# Patient Record
Sex: MALE | Race: WHITE | NOT HISPANIC OR LATINO | URBAN - METROPOLITAN AREA
[De-identification: names, ages, dates, MRNs, and addresses within clinical notes are randomized per-mention and may not be internally consistent; named-entity substitution may affect disease eponyms.]

---

## 2020-05-11 ENCOUNTER — EMERGENCY (EMERGENCY)
Facility: HOSPITAL | Age: 45
LOS: 0 days | Discharge: HOME | End: 2020-05-11
Payer: COMMERCIAL

## 2020-05-11 VITALS
DIASTOLIC BLOOD PRESSURE: 90 MMHG | HEART RATE: 75 BPM | SYSTOLIC BLOOD PRESSURE: 143 MMHG | OXYGEN SATURATION: 94 % | RESPIRATION RATE: 18 BRPM | TEMPERATURE: 98 F

## 2020-05-11 DIAGNOSIS — R50.9 FEVER, UNSPECIFIED: ICD-10-CM

## 2020-05-11 DIAGNOSIS — R11.0 NAUSEA: ICD-10-CM

## 2020-05-11 DIAGNOSIS — I10 ESSENTIAL (PRIMARY) HYPERTENSION: ICD-10-CM

## 2020-05-11 DIAGNOSIS — B34.9 VIRAL INFECTION, UNSPECIFIED: ICD-10-CM

## 2020-05-11 DIAGNOSIS — G35 MULTIPLE SCLEROSIS: ICD-10-CM

## 2020-05-11 PROCEDURE — U0003: CPT

## 2020-05-11 PROCEDURE — 99283 EMERGENCY DEPT VISIT LOW MDM: CPT

## 2020-05-11 NOTE — ED STATDOCS - OBJECTIVE STATEMENT
Pt with PMH of HTN, MS presents to ED with nausea, subjective fever/chills intermittently x 6 days. Pt does not believe he was recently exposed to COVID-19. Pt here for testing.

## 2020-05-11 NOTE — ED STATDOCS - PATIENT PORTAL LINK FT
You can access the FollowMyHealth Patient Portal offered by [facility] by registering at the following website: [email or web address]. By joining [employer]’s FollowMyHealth portal, you will also be able to view your health information using other applications (apps) compatible with our system.

## 2020-05-12 LAB — SARS-COV-2 RNA SPEC QL NAA+PROBE: SIGNIFICANT CHANGE UP

## 2020-06-04 ENCOUNTER — TRANSCRIPTION ENCOUNTER (OUTPATIENT)
Age: 45
End: 2020-06-04

## 2020-10-07 ENCOUNTER — TRANSCRIPTION ENCOUNTER (OUTPATIENT)
Age: 45
End: 2020-10-07

## 2021-10-16 ENCOUNTER — EMERGENCY (EMERGENCY)
Facility: HOSPITAL | Age: 46
LOS: 0 days | Discharge: HOME | End: 2021-10-16
Attending: EMERGENCY MEDICINE
Payer: COMMERCIAL

## 2021-10-16 VITALS
HEART RATE: 76 BPM | SYSTOLIC BLOOD PRESSURE: 120 MMHG | DIASTOLIC BLOOD PRESSURE: 87 MMHG | RESPIRATION RATE: 18 BRPM | OXYGEN SATURATION: 95 % | TEMPERATURE: 98 F

## 2021-10-16 VITALS — WEIGHT: 300.05 LBS | HEIGHT: 72 IN

## 2021-10-16 DIAGNOSIS — R19.7 DIARRHEA, UNSPECIFIED: ICD-10-CM

## 2021-10-16 DIAGNOSIS — Z20.822 CONTACT WITH AND (SUSPECTED) EXPOSURE TO COVID-19: ICD-10-CM

## 2021-10-16 DIAGNOSIS — G35 MULTIPLE SCLEROSIS: ICD-10-CM

## 2021-10-16 DIAGNOSIS — I10 ESSENTIAL (PRIMARY) HYPERTENSION: ICD-10-CM

## 2021-10-16 DIAGNOSIS — R10.9 UNSPECIFIED ABDOMINAL PAIN: ICD-10-CM

## 2021-10-16 DIAGNOSIS — B34.9 VIRAL INFECTION, UNSPECIFIED: ICD-10-CM

## 2021-10-16 DIAGNOSIS — R50.9 FEVER, UNSPECIFIED: ICD-10-CM

## 2021-10-16 LAB
ALBUMIN SERPL ELPH-MCNC: 3.8 G/DL — SIGNIFICANT CHANGE UP (ref 3.3–5)
ALP SERPL-CCNC: 61 U/L — SIGNIFICANT CHANGE UP (ref 40–120)
ALT FLD-CCNC: 53 U/L — SIGNIFICANT CHANGE UP (ref 12–78)
ANION GAP SERPL CALC-SCNC: 5 MMOL/L — SIGNIFICANT CHANGE UP (ref 5–17)
AST SERPL-CCNC: 34 U/L — SIGNIFICANT CHANGE UP (ref 15–37)
BASOPHILS # BLD AUTO: 0.05 K/UL — SIGNIFICANT CHANGE UP (ref 0–0.2)
BASOPHILS NFR BLD AUTO: 0.7 % — SIGNIFICANT CHANGE UP (ref 0–2)
BILIRUB SERPL-MCNC: 0.4 MG/DL — SIGNIFICANT CHANGE UP (ref 0.2–1.2)
BUN SERPL-MCNC: 14 MG/DL — SIGNIFICANT CHANGE UP (ref 7–23)
CALCIUM SERPL-MCNC: 9.4 MG/DL — SIGNIFICANT CHANGE UP (ref 8.5–10.1)
CHLORIDE SERPL-SCNC: 106 MMOL/L — SIGNIFICANT CHANGE UP (ref 96–108)
CO2 SERPL-SCNC: 27 MMOL/L — SIGNIFICANT CHANGE UP (ref 22–31)
CREAT SERPL-MCNC: 0.75 MG/DL — SIGNIFICANT CHANGE UP (ref 0.5–1.3)
EOSINOPHIL # BLD AUTO: 0.19 K/UL — SIGNIFICANT CHANGE UP (ref 0–0.5)
EOSINOPHIL NFR BLD AUTO: 2.6 % — SIGNIFICANT CHANGE UP (ref 0–6)
GLUCOSE SERPL-MCNC: 113 MG/DL — HIGH (ref 70–99)
HCT VFR BLD CALC: 44.6 % — SIGNIFICANT CHANGE UP (ref 39–50)
HGB BLD-MCNC: 15.1 G/DL — SIGNIFICANT CHANGE UP (ref 13–17)
IMM GRANULOCYTES NFR BLD AUTO: 0.4 % — SIGNIFICANT CHANGE UP (ref 0–1.5)
LYMPHOCYTES # BLD AUTO: 2.61 K/UL — SIGNIFICANT CHANGE UP (ref 1–3.3)
LYMPHOCYTES # BLD AUTO: 35.2 % — SIGNIFICANT CHANGE UP (ref 13–44)
MCHC RBC-ENTMCNC: 27.8 PG — SIGNIFICANT CHANGE UP (ref 27–34)
MCHC RBC-ENTMCNC: 33.9 GM/DL — SIGNIFICANT CHANGE UP (ref 32–36)
MCV RBC AUTO: 82.1 FL — SIGNIFICANT CHANGE UP (ref 80–100)
MONOCYTES # BLD AUTO: 0.62 K/UL — SIGNIFICANT CHANGE UP (ref 0–0.9)
MONOCYTES NFR BLD AUTO: 8.4 % — SIGNIFICANT CHANGE UP (ref 2–14)
NEUTROPHILS # BLD AUTO: 3.92 K/UL — SIGNIFICANT CHANGE UP (ref 1.8–7.4)
NEUTROPHILS NFR BLD AUTO: 52.7 % — SIGNIFICANT CHANGE UP (ref 43–77)
PLATELET # BLD AUTO: 278 K/UL — SIGNIFICANT CHANGE UP (ref 150–400)
POTASSIUM SERPL-MCNC: 3.7 MMOL/L — SIGNIFICANT CHANGE UP (ref 3.5–5.3)
POTASSIUM SERPL-SCNC: 3.7 MMOL/L — SIGNIFICANT CHANGE UP (ref 3.5–5.3)
PROT SERPL-MCNC: 7.7 GM/DL — SIGNIFICANT CHANGE UP (ref 6–8.3)
RBC # BLD: 5.43 M/UL — SIGNIFICANT CHANGE UP (ref 4.2–5.8)
RBC # FLD: 14 % — SIGNIFICANT CHANGE UP (ref 10.3–14.5)
SARS-COV-2 RNA SPEC QL NAA+PROBE: SIGNIFICANT CHANGE UP
SODIUM SERPL-SCNC: 138 MMOL/L — SIGNIFICANT CHANGE UP (ref 135–145)
WBC # BLD: 7.42 K/UL — SIGNIFICANT CHANGE UP (ref 3.8–10.5)
WBC # FLD AUTO: 7.42 K/UL — SIGNIFICANT CHANGE UP (ref 3.8–10.5)

## 2021-10-16 PROCEDURE — U0005: CPT

## 2021-10-16 PROCEDURE — 99284 EMERGENCY DEPT VISIT MOD MDM: CPT

## 2021-10-16 PROCEDURE — 85025 COMPLETE CBC W/AUTO DIFF WBC: CPT

## 2021-10-16 PROCEDURE — 99283 EMERGENCY DEPT VISIT LOW MDM: CPT

## 2021-10-16 PROCEDURE — 80053 COMPREHEN METABOLIC PANEL: CPT

## 2021-10-16 PROCEDURE — 36415 COLL VENOUS BLD VENIPUNCTURE: CPT

## 2021-10-16 PROCEDURE — U0003: CPT

## 2021-10-16 RX ORDER — SODIUM CHLORIDE 9 MG/ML
1000 INJECTION INTRAMUSCULAR; INTRAVENOUS; SUBCUTANEOUS ONCE
Refills: 0 | Status: COMPLETED | OUTPATIENT
Start: 2021-10-16 | End: 2021-10-16

## 2021-10-16 RX ADMIN — SODIUM CHLORIDE 1000 MILLILITER(S): 9 INJECTION INTRAMUSCULAR; INTRAVENOUS; SUBCUTANEOUS at 14:17

## 2021-10-16 NOTE — ED ADULT TRIAGE NOTE - CHIEF COMPLAINT QUOTE
c/o abdominal discomfort, diarrhea, fever and diaphoretic episodes for past 5 days, was tested for covid and flu which was negative, has been taking tylenol/advil with no relief in symptomsHX: MS, HTN

## 2021-10-16 NOTE — ED ADULT NURSE NOTE - OBJECTIVE STATEMENT
Patient presents to ED complaining of diarrhea. Patient complaining of diarrhea and fevers since Monday, TMAX 101.3. Last tylenol taken at approx 830 am. Patient complaining of intermittent generalized abdominal pain. Patient complaining of 2-6 episodes of diarrhea daily. Patient able to tolerate PO intake. Patient was seen by PCP, stool sample sent today by PCP. Patient denies respiratory symptoms, N/V, CP, SOB, burning with urination.

## 2021-10-16 NOTE — ED ADULT NURSE NOTE - NSIMPLEMENTINTERV_GEN_ALL_ED
Implemented All Universal Safety Interventions:  [place] to call system. Call bell, personal items and telephone within reach. Instruct patient to call for assistance. Room bathroom lighting operational. Non-slip footwear when patient is off stretcher. Physically safe environment: no spills, clutter or unnecessary equipment. Stretcher in lowest position, wheels locked, appropriate side rails in place.

## 2021-10-16 NOTE — ED STATDOCS - CLINICAL SUMMARY MEDICAL DECISION MAKING FREE TEXT BOX
47 y/o m with hx of MS, with 1 week of low grade fever with some diarrhea and abd cramping, no fever last few days, abd soft, nt, stool cultures sent this morning, check labs, ivf

## 2021-10-16 NOTE — ED STATDOCS - OBJECTIVE STATEMENT
47 y/o male with a PMHx of HTN, MS, presents to the ED c/o diarrhea, abdominal discomfort, fever, chills started on 10/11/21. Tmax 101.6F. Last fever a few days ago. +associated night sweats. COVID test on Monday which was negative. Reports 2 episodes of diarrhea today. Denies vomiting. Pt traveled to [place] a few weeks ago. No sick contacts. +COVID vaccinated.

## 2021-10-16 NOTE — ED STATDOCS - PATIENT PORTAL LINK FT
You can access the FollowMyHealth Patient Portal offered by [facility] by registering at the following website: [email or web address]. By joining [employer]’s FollowMyHealth portal, you will also be able to view your health information using other applications (apps) compatible with our system.

## 2021-10-16 NOTE — ED STATDOCS - ATTENDING CONTRIBUTION TO CARE
I,Ron Campbell MD,  performed the initial face to face bedside interview with this patient regarding history of present illness, review of symptoms and relevant past medical, social and family history.  I completed an independent physical examination.  I was the initial provider who evaluated this patient. I have signed out the follow up of any pending tests (i.e. labs, radiological studies) to the ACP.  I have communicated the patient’s plan of care and disposition with the ACP.  The history, relevant review of systems, past medical and surgical history, medical decision making, and physical examination was documented by the scribe in my presence and I attest to the accuracy of the documentation.

## 2021-10-16 NOTE — ED STATDOCS - NSFOLLOWUPINSTRUCTIONS_ED_ALL_ED_FT
Please follow up with your Primary MD in 1-2 days. Return to ED immediately for any new or concerning symptoms or worsening symptoms    VIRAL SYNDROME - AfterCare(R) Instructions(ER/ED)           Viral Syndrome    WHAT YOU NEED TO KNOW:    Viral syndrome is a term used for symptoms of an infection caused by a virus. Viruses are spread easily from person to person through the air and on shared items.    DISCHARGE INSTRUCTIONS:    Call your local emergency number (911 in the [place]) or have someone else call if:   •You have a seizure.      •You cannot be woken.      •You have chest pain or trouble breathing.      Return to the emergency department if:   •You have a stiff neck, a bad headache, and sensitivity to light.      •You feel weak, dizzy, or confused.      •You stop urinating or urinate a lot less than usual.      •You cough up blood or thick yellow or green mucus.      •You have severe abdominal pain or your abdomen is larger than usual.      Call your doctor if:   •Your symptoms do not get better with treatment or get worse after 3 days.      •You have a rash or ear pain.      •You have burning when you urinate.      •You have questions or concerns about your condition or care.      Medicines: You may need any of the following:   •Acetaminophen decreases pain and fever. It is available without a doctor's order. Ask how much to take and how often to take it. Follow directions. Read the labels of all other medicines you are using to see if they also contain acetaminophen, or ask your doctor or pharmacist. Acetaminophen can cause liver damage if not taken correctly. Do not use more than 4 grams (4,000 milligrams) total of acetaminophen in one day.       •NSAIDs, such as ibuprofen, help decrease swelling, pain, and fever. NSAIDs can cause stomach bleeding or kidney problems in certain people. If you take blood thinner medicine, always ask your healthcare provider if NSAIDs are safe for you. Always read the medicine label and follow directions.      •Cold medicine helps decrease swelling, control a cough, and relieve chest or nasal congestion.       •Saline nasal spray helps decrease nasal congestion.       •Take your medicine as directed. Contact your healthcare provider if you think your medicine is not helping or if you have side effects. Tell him of her if you are allergic to any medicine. Keep a list of the medicines, vitamins, and herbs you take. Include the amounts, and when and why you take them. Bring the list or the pill bottles to follow-up visits. Carry your medicine list with you in case of an emergency.      Manage your symptoms:   •Drink liquids as directed to prevent dehydration. Ask how much liquid to drink each day and which liquids are best for you. Ask if you should drink an oral rehydration solution (ORS). An ORS has the right amounts of water, salts, and sugar you need to replace body fluids. This may help prevent dehydration caused by vomiting or diarrhea. Do not drink liquids with caffeine. Liquids with caffeine can make dehydration worse.      •Get plenty of rest to help your body heal. Take naps throughout the day. Ask your healthcare provider when you can return to work and your normal activities.      •Use a cool mist humidifier to help you breathe easier. Ask your healthcare provider how to use a cool mist humidifier.      •Eat honey or use cough drops for a sore throat. Cough drops are available without a doctor's order. Follow directions for taking cough drops.      •Do not smoke or be close to anyone who is smoking. Nicotine and other chemicals in cigarettes and cigars can cause lung damage. Smoking can also delay healing. Ask your healthcare provider for information if you currently smoke and need help to quit. E-cigarettes or smokeless tobacco still contain nicotine. Talk to your healthcare provider before you use these products.      Prevent the spread of germs:          •Wash your hands often. Wash your hands several times each day. Wash after you use the bathroom, change a child's diaper, and before you prepare or eat food. Use soap and water every time. Rub your soapy hands together, lacing your fingers. Wash the front and back of your hands, and in between your fingers. Use the fingers of one hand to scrub under the fingernails of the other hand. Wash for at least 20 seconds. Rinse with warm, running water for several seconds. Then dry your hands with a clean towel or paper towel. Use hand  that contains alcohol if soap and water are not available. Do not touch your eyes, nose, or mouth without washing your hands first.  Handwashing           •Cover a sneeze or cough. Use a tissue that covers your mouth and nose. Throw the tissue away in a trash can right away. Use the bend of your arm if a tissue is not available. Wash your hands well with soap and water or use a hand .      •Stay away from others while you are sick. Avoid crowds as much as possible.      •Ask about vaccines you may need. Talk to your healthcare provider about your vaccine history. He or she will tell you which vaccines you need, and when to get them.?Get the influenza (flu) vaccine as soon as recommended each year. The flu vaccine is available starting in September or October. Flu viruses change, so it is important to get a flu vaccine every year.      ?Get the pneumonia vaccine if recommended. This vaccine is usually recommended every 5 years. Your provider will tell you when to get this vaccine, if needed.        Follow up with your doctor as directed: Write down your questions so you remember to ask them during your visits.

## 2021-10-16 NOTE — ED STATDOCS - PROGRESS NOTE DETAILS
45 y/o M presents with diarrhea. Pt reports low grade temp, intermittent diarrhea and abdominal cramping for the past week. Had stool cultures sent this morning. No other complaints at this time. -Arsh Stone PA-C 47 y/o M presents with diarrhea. Pt reports low grade temp, intermittent diarrhea and abdominal cramping for the past week. Had stool cultures sent this morning. Denies n/v, CP, SOB, rashes, or other complaints at this time. -Arsh Stone PA-C Results reviewed and discussed with pt. Pt nontoxic and well appearing at time of dc. Discussed importance of close FU with PMD. Pt asked to return to ED immediately for any new or concerning sx or worsening. Pt acknowledges and understands plan -Arsh Stone PA-C

## 2022-03-02 ENCOUNTER — EMERGENCY (EMERGENCY)
Facility: HOSPITAL | Age: 47
LOS: 0 days | Discharge: HOME | End: 2022-03-02
Attending: EMERGENCY MEDICINE
Payer: COMMERCIAL

## 2022-03-02 VITALS
DIASTOLIC BLOOD PRESSURE: 85 MMHG | TEMPERATURE: 98 F | SYSTOLIC BLOOD PRESSURE: 136 MMHG | OXYGEN SATURATION: 98 % | HEART RATE: 86 BPM | RESPIRATION RATE: 18 BRPM

## 2022-03-02 VITALS — HEIGHT: 72 IN | WEIGHT: 300.05 LBS

## 2022-03-02 DIAGNOSIS — N20.0 CALCULUS OF KIDNEY: ICD-10-CM

## 2022-03-02 DIAGNOSIS — I10 ESSENTIAL (PRIMARY) HYPERTENSION: ICD-10-CM

## 2022-03-02 DIAGNOSIS — M54.50 LOW BACK PAIN, UNSPECIFIED: ICD-10-CM

## 2022-03-02 PROBLEM — G35 MULTIPLE SCLEROSIS: Chronic | Status: ACTIVE | Noted: 2021-10-16

## 2022-03-02 LAB
ALBUMIN SERPL ELPH-MCNC: 3.6 G/DL — SIGNIFICANT CHANGE UP (ref 3.3–5)
ALP SERPL-CCNC: 56 U/L — SIGNIFICANT CHANGE UP (ref 40–120)
ALT FLD-CCNC: 50 U/L — SIGNIFICANT CHANGE UP (ref 12–78)
ANION GAP SERPL CALC-SCNC: 5 MMOL/L — SIGNIFICANT CHANGE UP (ref 5–17)
APPEARANCE UR: ABNORMAL
AST SERPL-CCNC: 27 U/L — SIGNIFICANT CHANGE UP (ref 15–37)
BACTERIA # UR AUTO: NEGATIVE — SIGNIFICANT CHANGE UP
BASOPHILS # BLD AUTO: 0.06 K/UL — SIGNIFICANT CHANGE UP (ref 0–0.2)
BASOPHILS NFR BLD AUTO: 0.5 % — SIGNIFICANT CHANGE UP (ref 0–2)
BILIRUB SERPL-MCNC: 0.4 MG/DL — SIGNIFICANT CHANGE UP (ref 0.2–1.2)
BILIRUB UR-MCNC: NEGATIVE — SIGNIFICANT CHANGE UP
BUN SERPL-MCNC: 16 MG/DL — SIGNIFICANT CHANGE UP (ref 7–23)
CALCIUM SERPL-MCNC: 9.5 MG/DL — SIGNIFICANT CHANGE UP (ref 8.5–10.1)
CHLORIDE SERPL-SCNC: 108 MMOL/L — SIGNIFICANT CHANGE UP (ref 96–108)
CO2 SERPL-SCNC: 26 MMOL/L — SIGNIFICANT CHANGE UP (ref 22–31)
COLOR SPEC: YELLOW — SIGNIFICANT CHANGE UP
CREAT SERPL-MCNC: 0.87 MG/DL — SIGNIFICANT CHANGE UP (ref 0.5–1.3)
DIFF PNL FLD: ABNORMAL
EGFR: 107 ML/MIN/1.73M2 — SIGNIFICANT CHANGE UP
EOSINOPHIL # BLD AUTO: 0.13 K/UL — SIGNIFICANT CHANGE UP (ref 0–0.5)
EOSINOPHIL NFR BLD AUTO: 1.2 % — SIGNIFICANT CHANGE UP (ref 0–6)
EPI CELLS # UR: NEGATIVE — SIGNIFICANT CHANGE UP
GLUCOSE SERPL-MCNC: 159 MG/DL — HIGH (ref 70–99)
GLUCOSE UR QL: NEGATIVE — SIGNIFICANT CHANGE UP
HCT VFR BLD CALC: 45 % — SIGNIFICANT CHANGE UP (ref 39–50)
HGB BLD-MCNC: 15.1 G/DL — SIGNIFICANT CHANGE UP (ref 13–17)
IMM GRANULOCYTES NFR BLD AUTO: 0.4 % — SIGNIFICANT CHANGE UP (ref 0–1.5)
KETONES UR-MCNC: NEGATIVE — SIGNIFICANT CHANGE UP
LEUKOCYTE ESTERASE UR-ACNC: NEGATIVE — SIGNIFICANT CHANGE UP
LYMPHOCYTES # BLD AUTO: 2.37 K/UL — SIGNIFICANT CHANGE UP (ref 1–3.3)
LYMPHOCYTES # BLD AUTO: 21.5 % — SIGNIFICANT CHANGE UP (ref 13–44)
MCHC RBC-ENTMCNC: 27.7 PG — SIGNIFICANT CHANGE UP (ref 27–34)
MCHC RBC-ENTMCNC: 33.6 GM/DL — SIGNIFICANT CHANGE UP (ref 32–36)
MCV RBC AUTO: 82.6 FL — SIGNIFICANT CHANGE UP (ref 80–100)
MONOCYTES # BLD AUTO: 0.49 K/UL — SIGNIFICANT CHANGE UP (ref 0–0.9)
MONOCYTES NFR BLD AUTO: 4.4 % — SIGNIFICANT CHANGE UP (ref 2–14)
NEUTROPHILS # BLD AUTO: 7.95 K/UL — HIGH (ref 1.8–7.4)
NEUTROPHILS NFR BLD AUTO: 72 % — SIGNIFICANT CHANGE UP (ref 43–77)
NITRITE UR-MCNC: NEGATIVE — SIGNIFICANT CHANGE UP
PH UR: 7 — SIGNIFICANT CHANGE UP (ref 5–8)
PLATELET # BLD AUTO: 289 K/UL — SIGNIFICANT CHANGE UP (ref 150–400)
POTASSIUM SERPL-MCNC: 3.8 MMOL/L — SIGNIFICANT CHANGE UP (ref 3.5–5.3)
POTASSIUM SERPL-SCNC: 3.8 MMOL/L — SIGNIFICANT CHANGE UP (ref 3.5–5.3)
PROT SERPL-MCNC: 7.3 GM/DL — SIGNIFICANT CHANGE UP (ref 6–8.3)
PROT UR-MCNC: NEGATIVE — SIGNIFICANT CHANGE UP
RBC # BLD: 5.45 M/UL — SIGNIFICANT CHANGE UP (ref 4.2–5.8)
RBC # FLD: 13.9 % — SIGNIFICANT CHANGE UP (ref 10.3–14.5)
RBC CASTS # UR COMP ASSIST: >50 /HPF (ref 0–4)
SODIUM SERPL-SCNC: 139 MMOL/L — SIGNIFICANT CHANGE UP (ref 135–145)
SP GR SPEC: 1.01 — SIGNIFICANT CHANGE UP (ref 1.01–1.02)
UROBILINOGEN FLD QL: NEGATIVE — SIGNIFICANT CHANGE UP
WBC # BLD: 11.04 K/UL — HIGH (ref 3.8–10.5)
WBC # FLD AUTO: 11.04 K/UL — HIGH (ref 3.8–10.5)
WBC UR QL: NEGATIVE — SIGNIFICANT CHANGE UP

## 2022-03-02 PROCEDURE — 81001 URINALYSIS AUTO W/SCOPE: CPT

## 2022-03-02 PROCEDURE — 96375 TX/PRO/DX INJ NEW DRUG ADDON: CPT

## 2022-03-02 PROCEDURE — 74176 CT ABD & PELVIS W/O CONTRAST: CPT | Mod: MA

## 2022-03-02 PROCEDURE — 96374 THER/PROPH/DIAG INJ IV PUSH: CPT

## 2022-03-02 PROCEDURE — 99284 EMERGENCY DEPT VISIT MOD MDM: CPT | Mod: 25

## 2022-03-02 PROCEDURE — 36415 COLL VENOUS BLD VENIPUNCTURE: CPT

## 2022-03-02 PROCEDURE — 85025 COMPLETE CBC W/AUTO DIFF WBC: CPT

## 2022-03-02 PROCEDURE — 74176 CT ABD & PELVIS W/O CONTRAST: CPT | Mod: 26,MA

## 2022-03-02 PROCEDURE — 80053 COMPREHEN METABOLIC PANEL: CPT

## 2022-03-02 PROCEDURE — 99285 EMERGENCY DEPT VISIT HI MDM: CPT

## 2022-03-02 RX ORDER — ONDANSETRON 8 MG/1
1 TABLET, FILM COATED ORAL
Qty: 12 | Refills: 0
Start: 2022-03-02

## 2022-03-02 RX ORDER — OXYCODONE AND ACETAMINOPHEN 5; 325 MG/1; MG/1
1 TABLET ORAL
Qty: 12 | Refills: 0
Start: 2022-03-02

## 2022-03-02 RX ORDER — SODIUM CHLORIDE 9 MG/ML
1000 INJECTION INTRAMUSCULAR; INTRAVENOUS; SUBCUTANEOUS ONCE
Refills: 0 | Status: COMPLETED | OUTPATIENT
Start: 2022-03-02 | End: 2022-03-02

## 2022-03-02 RX ORDER — KETOROLAC TROMETHAMINE 30 MG/ML
15 SYRINGE (ML) INJECTION ONCE
Refills: 0 | Status: DISCONTINUED | OUTPATIENT
Start: 2022-03-02 | End: 2022-03-02

## 2022-03-02 RX ORDER — ONDANSETRON 8 MG/1
4 TABLET, FILM COATED ORAL ONCE
Refills: 0 | Status: COMPLETED | OUTPATIENT
Start: 2022-03-02 | End: 2022-03-02

## 2022-03-02 RX ORDER — TAMSULOSIN HYDROCHLORIDE 0.4 MG/1
1 CAPSULE ORAL
Qty: 7 | Refills: 0
Start: 2022-03-02 | End: 2022-03-08

## 2022-03-02 RX ADMIN — SODIUM CHLORIDE 2000 MILLILITER(S): 9 INJECTION INTRAMUSCULAR; INTRAVENOUS; SUBCUTANEOUS at 11:06

## 2022-03-02 RX ADMIN — ONDANSETRON 4 MILLIGRAM(S): 8 TABLET, FILM COATED ORAL at 10:07

## 2022-03-02 RX ADMIN — Medication 15 MILLIGRAM(S): at 10:40

## 2022-03-02 RX ADMIN — Medication 15 MILLIGRAM(S): at 10:07

## 2022-03-02 NOTE — ED PROVIDER NOTE - CLINICAL SUMMARY MEDICAL DECISION MAKING FREE TEXT BOX
Pt given Toradol, Zofran. Labs and imaging will be obtained. Pt given Toradol, Zofran. Labs and imaging will be obtained. Pt's work up revealed a 5 mm mid ureteral stone, in addition, he does have a mass in the mid pole kidney as well, on re-evaluation, he is feeling much better, I discussed these results with hm, discussed close f/u with urology, he was written for percocet, flomax, zofran for home, he expressed understanding of return precautions and he was dc in stable condition.

## 2022-03-02 NOTE — ED PROVIDER NOTE - PHYSICAL EXAMINATION
Constitutional: NAD AAOx3  Eyes: PERRLA EOMI  Head: Normocephalic atraumatic  Mouth: MMM  Cardiac: regular rate   Resp: Lungs CTAB  GI: Abd s/nd. +Left CVAT  Neuro: CN2-12 intact  Extremities: Intact distal pulses b/l, no calf tenderness, normal ROM b/l UE and LE   Skin: No rashes

## 2022-03-02 NOTE — ED PROVIDER NOTE - CARE PROVIDER_API CALL
Joe Zuniga)  Urology  [address], [address] [place] [address]  Phone: [phone]  Fax: [phone]  Follow Up Time:

## 2022-03-02 NOTE — ED PROVIDER NOTE - PATIENT PORTAL LINK FT
You can access the FollowMyHealth Patient Portal offered by [facility] by registering at the following website: [email or web address]. By joining [employer]’s FollowMyHealth portal, you will also be able to view your health information using other applications (apps) compatible with our system.

## 2022-03-02 NOTE — ED PROVIDER NOTE - NS ED ROS FT
Constitutional: No fever or chills  Eyes: No visual changes  HEENT: No throat pain  CV: No chest pain  Resp: No SOB no cough  GI: No abd pain or vomiting. +Nausea  : No dysuria or hematuria  MSK: +Left-sided back pain  Skin: No rash  Neuro: No headache

## 2022-03-02 NOTE — ED PROVIDER NOTE - OBJECTIVE STATEMENT
45 y/o male with PMHx of HTN, MS presents to the ED c/o left-sided back pain. Pt states he woke up with the pain and initially thought he just slept on it wrong, but pain persisted and worsened. Also developed nausea, so came to the ED for evaluation. No Hx of kidney stones. Denies chest pain, SOB, vomiting, dysuria, hematuria. States he takes Glatiramer acetate injections 3 times a week for his MS, doesn't get any infusions. States this doesn't feel like his MS. Cannot recall neurologist name. NKDA.

## 2022-03-05 ENCOUNTER — INPATIENT (INPATIENT)
Facility: HOSPITAL | Age: 47
LOS: 0 days | Discharge: HOME | DRG: 661 | End: 2022-03-06
Attending: INTERNAL MEDICINE | Admitting: HOSPITALIST
Payer: COMMERCIAL

## 2022-03-05 VITALS
HEART RATE: 93 BPM | HEIGHT: 72 IN | WEIGHT: 300.05 LBS | SYSTOLIC BLOOD PRESSURE: 140 MMHG | OXYGEN SATURATION: 95 % | RESPIRATION RATE: 20 BRPM | DIASTOLIC BLOOD PRESSURE: 109 MMHG | TEMPERATURE: 98 F

## 2022-03-05 DIAGNOSIS — N20.0 CALCULUS OF KIDNEY: ICD-10-CM

## 2022-03-05 LAB
ALBUMIN SERPL ELPH-MCNC: 3.8 G/DL — SIGNIFICANT CHANGE UP (ref 3.3–5)
ALP SERPL-CCNC: 58 U/L — SIGNIFICANT CHANGE UP (ref 40–120)
ALT FLD-CCNC: 45 U/L — SIGNIFICANT CHANGE UP (ref 12–78)
ANION GAP SERPL CALC-SCNC: 6 MMOL/L — SIGNIFICANT CHANGE UP (ref 5–17)
APTT BLD: 32.1 SEC — SIGNIFICANT CHANGE UP (ref 27.5–35.5)
AST SERPL-CCNC: 27 U/L — SIGNIFICANT CHANGE UP (ref 15–37)
BASOPHILS # BLD AUTO: 0.06 K/UL — SIGNIFICANT CHANGE UP (ref 0–0.2)
BASOPHILS NFR BLD AUTO: 0.4 % — SIGNIFICANT CHANGE UP (ref 0–2)
BILIRUB SERPL-MCNC: 0.4 MG/DL — SIGNIFICANT CHANGE UP (ref 0.2–1.2)
BUN SERPL-MCNC: 20 MG/DL — SIGNIFICANT CHANGE UP (ref 7–23)
CALCIUM SERPL-MCNC: 9.4 MG/DL — SIGNIFICANT CHANGE UP (ref 8.5–10.1)
CHLORIDE SERPL-SCNC: 106 MMOL/L — SIGNIFICANT CHANGE UP (ref 96–108)
CO2 SERPL-SCNC: 28 MMOL/L — SIGNIFICANT CHANGE UP (ref 22–31)
CREAT SERPL-MCNC: 1.05 MG/DL — SIGNIFICANT CHANGE UP (ref 0.5–1.3)
EGFR: 88 ML/MIN/1.73M2 — SIGNIFICANT CHANGE UP
EOSINOPHIL # BLD AUTO: 0.11 K/UL — SIGNIFICANT CHANGE UP (ref 0–0.5)
EOSINOPHIL NFR BLD AUTO: 0.7 % — SIGNIFICANT CHANGE UP (ref 0–6)
GLUCOSE SERPL-MCNC: 199 MG/DL — HIGH (ref 70–99)
HCT VFR BLD CALC: 42.2 % — SIGNIFICANT CHANGE UP (ref 39–50)
HGB BLD-MCNC: 14.2 G/DL — SIGNIFICANT CHANGE UP (ref 13–17)
IMM GRANULOCYTES NFR BLD AUTO: 0.6 % — SIGNIFICANT CHANGE UP (ref 0–1.5)
INR BLD: 1.13 RATIO — SIGNIFICANT CHANGE UP (ref 0.88–1.16)
LACTATE SERPL-SCNC: 1.4 MMOL/L — SIGNIFICANT CHANGE UP (ref 0.7–2)
LYMPHOCYTES # BLD AUTO: 19.1 % — SIGNIFICANT CHANGE UP (ref 13–44)
LYMPHOCYTES # BLD AUTO: 2.97 K/UL — SIGNIFICANT CHANGE UP (ref 1–3.3)
MCHC RBC-ENTMCNC: 27.5 PG — SIGNIFICANT CHANGE UP (ref 27–34)
MCHC RBC-ENTMCNC: 33.6 GM/DL — SIGNIFICANT CHANGE UP (ref 32–36)
MCV RBC AUTO: 81.6 FL — SIGNIFICANT CHANGE UP (ref 80–100)
MONOCYTES # BLD AUTO: 0.68 K/UL — SIGNIFICANT CHANGE UP (ref 0–0.9)
MONOCYTES NFR BLD AUTO: 4.4 % — SIGNIFICANT CHANGE UP (ref 2–14)
NEUTROPHILS # BLD AUTO: 11.63 K/UL — HIGH (ref 1.8–7.4)
NEUTROPHILS NFR BLD AUTO: 74.8 % — SIGNIFICANT CHANGE UP (ref 43–77)
PLATELET # BLD AUTO: 259 K/UL — SIGNIFICANT CHANGE UP (ref 150–400)
POTASSIUM SERPL-MCNC: 3.3 MMOL/L — LOW (ref 3.5–5.3)
POTASSIUM SERPL-SCNC: 3.3 MMOL/L — LOW (ref 3.5–5.3)
PROT SERPL-MCNC: 7.1 GM/DL — SIGNIFICANT CHANGE UP (ref 6–8.3)
PROTHROM AB SERPL-ACNC: 13.1 SEC — SIGNIFICANT CHANGE UP (ref 10.5–13.4)
RBC # BLD: 5.17 M/UL — SIGNIFICANT CHANGE UP (ref 4.2–5.8)
RBC # FLD: 13.5 % — SIGNIFICANT CHANGE UP (ref 10.3–14.5)
SARS-COV-2 RNA SPEC QL NAA+PROBE: SIGNIFICANT CHANGE UP
SODIUM SERPL-SCNC: 140 MMOL/L — SIGNIFICANT CHANGE UP (ref 135–145)
WBC # BLD: 15.54 K/UL — HIGH (ref 3.8–10.5)
WBC # FLD AUTO: 15.54 K/UL — HIGH (ref 3.8–10.5)

## 2022-03-05 PROCEDURE — 71045 X-RAY EXAM CHEST 1 VIEW: CPT

## 2022-03-05 PROCEDURE — 87040 BLOOD CULTURE FOR BACTERIA: CPT

## 2022-03-05 PROCEDURE — 85027 COMPLETE CBC AUTOMATED: CPT

## 2022-03-05 PROCEDURE — 93010 ELECTROCARDIOGRAM REPORT: CPT

## 2022-03-05 PROCEDURE — 86900 BLOOD TYPING SEROLOGIC ABO: CPT

## 2022-03-05 PROCEDURE — 86850 RBC ANTIBODY SCREEN: CPT

## 2022-03-05 PROCEDURE — 87086 URINE CULTURE/COLONY COUNT: CPT

## 2022-03-05 PROCEDURE — 99285 EMERGENCY DEPT VISIT HI MDM: CPT

## 2022-03-05 PROCEDURE — 80048 BASIC METABOLIC PNL TOTAL CA: CPT

## 2022-03-05 PROCEDURE — 83605 ASSAY OF LACTIC ACID: CPT

## 2022-03-05 PROCEDURE — 86901 BLOOD TYPING SEROLOGIC RH(D): CPT

## 2022-03-05 PROCEDURE — 36415 COLL VENOUS BLD VENIPUNCTURE: CPT

## 2022-03-05 PROCEDURE — C9399: CPT

## 2022-03-05 PROCEDURE — 71045 X-RAY EXAM CHEST 1 VIEW: CPT | Mod: 26

## 2022-03-05 PROCEDURE — C1769: CPT

## 2022-03-05 PROCEDURE — 85730 THROMBOPLASTIN TIME PARTIAL: CPT

## 2022-03-05 PROCEDURE — 93005 ELECTROCARDIOGRAM TRACING: CPT

## 2022-03-05 PROCEDURE — 99223 1ST HOSP IP/OBS HIGH 75: CPT

## 2022-03-05 PROCEDURE — 76000 FLUOROSCOPY <1 HR PHYS/QHP: CPT

## 2022-03-05 PROCEDURE — 99222 1ST HOSP IP/OBS MODERATE 55: CPT

## 2022-03-05 PROCEDURE — 85610 PROTHROMBIN TIME: CPT

## 2022-03-05 PROCEDURE — C2617: CPT

## 2022-03-05 PROCEDURE — 52332 CYSTOSCOPY AND TREATMENT: CPT | Mod: LT

## 2022-03-05 RX ORDER — KETOROLAC TROMETHAMINE 30 MG/ML
30 SYRINGE (ML) INJECTION ONCE
Refills: 0 | Status: DISCONTINUED | OUTPATIENT
Start: 2022-03-05 | End: 2022-03-05

## 2022-03-05 RX ORDER — TAMSULOSIN HYDROCHLORIDE 0.4 MG/1
0.4 CAPSULE ORAL AT BEDTIME
Refills: 0 | Status: DISCONTINUED | OUTPATIENT
Start: 2022-03-05 | End: 2022-03-06

## 2022-03-05 RX ORDER — HYDROMORPHONE HYDROCHLORIDE 2 MG/ML
0.5 INJECTION INTRAMUSCULAR; INTRAVENOUS; SUBCUTANEOUS ONCE
Refills: 0 | Status: DISCONTINUED | OUTPATIENT
Start: 2022-03-05 | End: 2022-03-05

## 2022-03-05 RX ORDER — PHENAZOPYRIDINE HCL 100 MG
100 TABLET ORAL EVERY 8 HOURS
Refills: 0 | Status: DISCONTINUED | OUTPATIENT
Start: 2022-03-05 | End: 2022-03-06

## 2022-03-05 RX ORDER — ONDANSETRON 8 MG/1
4 TABLET, FILM COATED ORAL ONCE
Refills: 0 | Status: DISCONTINUED | OUTPATIENT
Start: 2022-03-05 | End: 2022-03-06

## 2022-03-05 RX ORDER — GLATIRAMER ACETATE 20 MG/ML
40 INJECTION, SOLUTION SUBCUTANEOUS
Refills: 0 | Status: DISCONTINUED | OUTPATIENT
Start: 2022-03-07 | End: 2022-03-06

## 2022-03-05 RX ORDER — SODIUM CHLORIDE 9 MG/ML
1000 INJECTION, SOLUTION INTRAVENOUS
Refills: 0 | Status: DISCONTINUED | OUTPATIENT
Start: 2022-03-05 | End: 2022-03-06

## 2022-03-05 RX ORDER — FENTANYL CITRATE 50 UG/ML
50 INJECTION INTRAVENOUS
Refills: 0 | Status: DISCONTINUED | OUTPATIENT
Start: 2022-03-05 | End: 2022-03-06

## 2022-03-05 RX ORDER — SODIUM CHLORIDE 9 MG/ML
1000 INJECTION INTRAMUSCULAR; INTRAVENOUS; SUBCUTANEOUS ONCE
Refills: 0 | Status: COMPLETED | OUTPATIENT
Start: 2022-03-05 | End: 2022-03-05

## 2022-03-05 RX ORDER — POTASSIUM CHLORIDE 20 MEQ
10 PACKET (EA) ORAL
Refills: 0 | Status: COMPLETED | OUTPATIENT
Start: 2022-03-05 | End: 2022-03-05

## 2022-03-05 RX ORDER — ONDANSETRON 8 MG/1
4 TABLET, FILM COATED ORAL ONCE
Refills: 0 | Status: DISCONTINUED | OUTPATIENT
Start: 2022-03-05 | End: 2022-03-05

## 2022-03-05 RX ORDER — MORPHINE SULFATE 50 MG/1
4 CAPSULE, EXTENDED RELEASE ORAL ONCE
Refills: 0 | Status: DISCONTINUED | OUTPATIENT
Start: 2022-03-05 | End: 2022-03-05

## 2022-03-05 RX ORDER — LOSARTAN POTASSIUM 100 MG/1
100 TABLET, FILM COATED ORAL DAILY
Refills: 0 | Status: DISCONTINUED | OUTPATIENT
Start: 2022-03-05 | End: 2022-03-06

## 2022-03-05 RX ORDER — CEFTRIAXONE 500 MG/1
1000 INJECTION, POWDER, FOR SOLUTION INTRAMUSCULAR; INTRAVENOUS ONCE
Refills: 0 | Status: COMPLETED | OUTPATIENT
Start: 2022-03-05 | End: 2022-03-05

## 2022-03-05 RX ORDER — FENTANYL CITRATE 50 UG/ML
50 INJECTION INTRAVENOUS
Refills: 0 | Status: DISCONTINUED | OUTPATIENT
Start: 2022-03-05 | End: 2022-03-05

## 2022-03-05 RX ORDER — PHENAZOPYRIDINE HCL 100 MG
200 TABLET ORAL ONCE
Refills: 0 | Status: COMPLETED | OUTPATIENT
Start: 2022-03-05 | End: 2022-03-05

## 2022-03-05 RX ORDER — ONDANSETRON 8 MG/1
4 TABLET, FILM COATED ORAL ONCE
Refills: 0 | Status: COMPLETED | OUTPATIENT
Start: 2022-03-05 | End: 2022-03-05

## 2022-03-05 RX ORDER — CHOLECALCIFEROL (VITAMIN D3) 125 MCG
2000 CAPSULE ORAL DAILY
Refills: 0 | Status: DISCONTINUED | OUTPATIENT
Start: 2022-03-05 | End: 2022-03-06

## 2022-03-05 RX ORDER — ONDANSETRON 8 MG/1
4 TABLET, FILM COATED ORAL EVERY 6 HOURS
Refills: 0 | Status: DISCONTINUED | OUTPATIENT
Start: 2022-03-05 | End: 2022-03-06

## 2022-03-05 RX ORDER — PANTOPRAZOLE SODIUM 20 MG/1
40 TABLET, DELAYED RELEASE ORAL DAILY
Refills: 0 | Status: DISCONTINUED | OUTPATIENT
Start: 2022-03-05 | End: 2022-03-06

## 2022-03-05 RX ORDER — ACETAMINOPHEN 500 MG
650 TABLET ORAL EVERY 6 HOURS
Refills: 0 | Status: DISCONTINUED | OUTPATIENT
Start: 2022-03-05 | End: 2022-03-06

## 2022-03-05 RX ORDER — LANOLIN ALCOHOL/MO/W.PET/CERES
3 CREAM (GRAM) TOPICAL AT BEDTIME
Refills: 0 | Status: DISCONTINUED | OUTPATIENT
Start: 2022-03-05 | End: 2022-03-06

## 2022-03-05 RX ORDER — OXYCODONE HYDROCHLORIDE 5 MG/1
10 TABLET ORAL ONCE
Refills: 0 | Status: DISCONTINUED | OUTPATIENT
Start: 2022-03-05 | End: 2022-03-06

## 2022-03-05 RX ORDER — SODIUM CHLORIDE 9 MG/ML
1000 INJECTION, SOLUTION INTRAVENOUS
Refills: 0 | Status: DISCONTINUED | OUTPATIENT
Start: 2022-03-05 | End: 2022-03-05

## 2022-03-05 RX ORDER — MORPHINE SULFATE 50 MG/1
2 CAPSULE, EXTENDED RELEASE ORAL EVERY 4 HOURS
Refills: 0 | Status: DISCONTINUED | OUTPATIENT
Start: 2022-03-05 | End: 2022-03-06

## 2022-03-05 RX ORDER — SODIUM CHLORIDE 9 MG/ML
1000 INJECTION INTRAMUSCULAR; INTRAVENOUS; SUBCUTANEOUS
Refills: 0 | Status: DISCONTINUED | OUTPATIENT
Start: 2022-03-05 | End: 2022-03-06

## 2022-03-05 RX ORDER — HYDROMORPHONE HYDROCHLORIDE 2 MG/ML
1 INJECTION INTRAMUSCULAR; INTRAVENOUS; SUBCUTANEOUS EVERY 4 HOURS
Refills: 0 | Status: DISCONTINUED | OUTPATIENT
Start: 2022-03-05 | End: 2022-03-06

## 2022-03-05 RX ADMIN — Medication 100 MILLIGRAM(S): at 21:23

## 2022-03-05 RX ADMIN — HYDROMORPHONE HYDROCHLORIDE 0.5 MILLIGRAM(S): 2 INJECTION INTRAMUSCULAR; INTRAVENOUS; SUBCUTANEOUS at 09:15

## 2022-03-05 RX ADMIN — Medication 200 MILLIGRAM(S): at 16:32

## 2022-03-05 RX ADMIN — TAMSULOSIN HYDROCHLORIDE 0.4 MILLIGRAM(S): 0.4 CAPSULE ORAL at 21:23

## 2022-03-05 RX ADMIN — ONDANSETRON 4 MILLIGRAM(S): 8 TABLET, FILM COATED ORAL at 00:58

## 2022-03-05 RX ADMIN — Medication 100 MILLIEQUIVALENT(S): at 08:38

## 2022-03-05 RX ADMIN — SODIUM CHLORIDE 1000 MILLILITER(S): 9 INJECTION INTRAMUSCULAR; INTRAVENOUS; SUBCUTANEOUS at 01:02

## 2022-03-05 RX ADMIN — CEFTRIAXONE 100 MILLIGRAM(S): 500 INJECTION, POWDER, FOR SOLUTION INTRAMUSCULAR; INTRAVENOUS at 08:38

## 2022-03-05 RX ADMIN — Medication 100 MILLIEQUIVALENT(S): at 11:15

## 2022-03-05 RX ADMIN — SODIUM CHLORIDE 100 MILLILITER(S): 9 INJECTION INTRAMUSCULAR; INTRAVENOUS; SUBCUTANEOUS at 08:40

## 2022-03-05 RX ADMIN — Medication 100 MILLIEQUIVALENT(S): at 12:14

## 2022-03-05 RX ADMIN — Medication 30 MILLIGRAM(S): at 00:58

## 2022-03-05 RX ADMIN — MORPHINE SULFATE 4 MILLIGRAM(S): 50 CAPSULE, EXTENDED RELEASE ORAL at 04:22

## 2022-03-05 RX ADMIN — HYDROMORPHONE HYDROCHLORIDE 0.5 MILLIGRAM(S): 2 INJECTION INTRAMUSCULAR; INTRAVENOUS; SUBCUTANEOUS at 07:35

## 2022-03-05 RX ADMIN — SODIUM CHLORIDE 75 MILLILITER(S): 9 INJECTION, SOLUTION INTRAVENOUS at 21:23

## 2022-03-05 RX ADMIN — Medication 30 MILLIGRAM(S): at 05:36

## 2022-03-05 RX ADMIN — MORPHINE SULFATE 4 MILLIGRAM(S): 50 CAPSULE, EXTENDED RELEASE ORAL at 02:09

## 2022-03-05 NOTE — BRIEF OPERATIVE NOTE - NSICDXBRIEFPREOP_GEN_ALL_CORE_FT
PRE-OP DIAGNOSIS:  Renal colic 05-Mar-2022 14:50:36  Kevin Carvalho  Ureteral stone with hydronephrosis 05-Mar-2022 14:51:16  Kevin Carvalho

## 2022-03-05 NOTE — PATIENT PROFILE ADULT - FALL HARM RISK - HARM RISK INTERVENTIONS

## 2022-03-05 NOTE — ED ADULT NURSE NOTE - OBJECTIVE STATEMENT
Pt. presents to ED for L flank pain. Pt. reports recent visit to  on Wednesday dx kidney stone, sent home on oxycodone.  Pt. reports for past few hours nausea, vomit after taking oxycodone PO, no CP or SOB reported., MD LO bedside

## 2022-03-05 NOTE — H&P ADULT - ASSESSMENT
46/M with PMHX of HTN, MS, was brought to the ED for c/o Left flank pain, severe pain, started last night, associated with vomiting. Pt was recently in ED and Dx with left ureteral stone.  Pt seen by urology and going for ureteral stent today.     Pt admitted with     1) 5 mm Left ureteral stent + left hydroureteronephrosis + Severe left flank pain :  admit to med floor  NPO  iv fluids  pain meds prn  to OR for ureteral stent today     2) 2.3 cm partially calcified left midpole renal mass. r/o malignancy  Urology eval for further work up/Bx    3) Hypokalemia 3.3: replace, recheck    4) HTN: cont losartan  hold HCTZ    5) MS: cont copaxone on MWF    6) DVT PPX: ambulation/venodynes    poc discussed with pt, team     Pt is medically optimized and is low risk for ureteral stent.

## 2022-03-05 NOTE — CONSULT NOTE ADULT - ASSESSMENT
45 Y/O M with left renal stone mild hydronephrosis  - NPO, IVF  - Pain control,  - C/W Abx  - Plan of ureteral stent in OR today.  Above plan D/W Dr. Carvalho

## 2022-03-05 NOTE — ED ADULT TRIAGE NOTE - CHIEF COMPLAINT QUOTE
Pt. reports to ED for flank pain. Pt. reports recent visit to  on Wednesday dx kidney stone, sent home on oxycodone.  Pt. reports for past few hours nausea, vomiting 5-6x, pain 10/10 localized to LL back.

## 2022-03-05 NOTE — CONSULT NOTE ADULT - SUBJECTIVE AND OBJECTIVE BOX
HPI Objective Statement: 47 y/o male in ED c/o worsening n/v/left flank pain tonight.   pt states unable to tolerate PO.  and 5-6 episodes of emesis .   Pt states seen in ED a few days ago for same and diagnosed with kidney stone.   pt states has appt with urologist next Friday.   Pt denies any fever, HA, CP, sob.     PAST MEDICAL & SURGICAL HISTORY:  Multiple sclerosis    HTN (hypertension)        REVIEW OF SYSTEMS:    CONSTITUTIONAL:  fevers or chills  HEENT: No visual changes  ENDO: No sweating  NECK: No pain or stiffness  MUSCULOSKELETAL: No back pain, no joint pain  RESPIRATORY: No shortness of breath  CARDIOVASCULAR: No chest pain  GASTROINTESTINAL: Left flank pain, + nausea, vomiting,  No diarrhea or constipation.   NEUROLOGICAL: No mental status changes  PSYCH: No depression, no mood changes  SKIN: No itching      MEDICATIONS  (STANDING):  potassium chloride  10 mEq/100 mL IVPB 10 milliEquivalent(s) IV Intermittent every 1 hour  sodium chloride 0.9%. 1000 milliLiter(s) (100 mL/Hr) IV Continuous <Continuous>    MEDICATIONS  (PRN):  morphine  - Injectable 2 milliGRAM(s) IV Push every 4 hours PRN Severe Pain (7 - 10)  ondansetron Injectable 4 milliGRAM(s) IV Push every 6 hours PRN Nausea and/or Vomiting      Allergies    No Known Allergies    Intolerances        SOCIAL HISTORY: No illicit drug use    FAMILY HISTORY:      Vital Signs Last 24 Hrs  T(C): 37.1 (05 Mar 2022 07:11), Max: 37.1 (05 Mar 2022 07:11)  T(F): 98.7 (05 Mar 2022 07:11), Max: 98.7 (05 Mar 2022 07:11)  HR: 96 (05 Mar 2022 07:11) (87 - 96)  BP: 124/82 (05 Mar 2022 07:11) (111/83 - 140/109)  BP(mean): 95 (05 Mar 2022 07:11) (95 - 95)  RR: 18 (05 Mar 2022 07:11) (18 - 20)  SpO2: 94% (05 Mar 2022 07:11) (94% - 100%)    PHYSICAL EXAM:    Constitutional: NAD, well-developed  HEENT: EOMI  Neck: no pain  Back: left CVA tenderness  Respiratory: No accessory respiratory muscle use  Abd: Soft, NT/ND  no organomegally  no hernia  : Low UOP since last night  Extremities: no edema  Neurological: A/O x 3  Psychiatric: Normal mood, normal affect  Skin: No rashes      LABS:                        14.2   15.54 )-----------( 259      ( 05 Mar 2022 00:54 )             42.2     03-05    140  |  106  |  20  ----------------------------<  199<H>  3.3<L>   |  28  |  1.05    Ca    9.4      05 Mar 2022 00:54    TPro  7.1  /  Alb  3.8  /  TBili  0.4  /  DBili  x   /  AST  27  /  ALT  45  /  AlkPhos  58  03-05    PT/INR - ( 05 Mar 2022 07:49 )   PT: 13.1 sec;   INR: 1.13 ratio         PTT - ( 05 Mar 2022 07:49 )  PTT:32.1 sec    Urine Culture: pending      RADIOLOGY & ADDITIONAL STUDIES:  < from: CT Abdomen and Pelvis No Cont (03.02.22 @ 09:49) >  Mild left hydroureteronephrosis to the level of a 5 mm mid ureteral   calculus. Nonobstructing left intrarenal calculus.    2.3 cm partially calcified left midpole renal mass. Recommend comparison   with prior studies. Otherwise, recommend follow-up pre and postcontrast   CT with renal mass protocol.      < end of copied text >

## 2022-03-05 NOTE — BRIEF OPERATIVE NOTE - COMMENTS
1. Can be discharged when stable and improving labs  2. Continue Flomax  3. Pyridium 100 mg TID x 3 days  4. PO antibiotics: Ceftin 500 mg BID x 5 days   5. Tylenol/Ibuprofen for pain/discomfort   6. Will schedule for out patient Ureteroscopy in few weeks.

## 2022-03-05 NOTE — CONSULT NOTE ADULT - ATTENDING COMMENTS
Renal mass:  Ureteral stone with hydronephrosis:  Renal colic:    Discussed treatment options and recommended Cystoscopy, retrograde pyelogram and left ureteral stent placement.   Pt agreeable.   Risks and benefits were discussed. Informed consent obtained.     Discussed will need definitive management of ureteral stone at later date. Discussed treatment options: ESWL Vs Ureteroscopy.   Risks and benefits of each were discussed. Recommended Ureteroscopy.   Patient agreeable. Will schedule out patient.     Will need repeat CT scan with and without IV contrast Or MRI to evaluate left renal mass.

## 2022-03-05 NOTE — ED ADULT NURSE REASSESSMENT NOTE - NS ED NURSE REASSESS COMMENT FT1
Pt reports pain 2/10 at this time, awaiting urology consult
pt c/o left flank pain again as per MD Carlos give 4 mg morphine

## 2022-03-05 NOTE — H&P ADULT - NSHPPHYSICALEXAM_GEN_ALL_CORE
PHYSICAL EXAM:    Daily Height in cm: 182.88 (05 Mar 2022 00:24)    Daily     Vital Signs Last 24 Hrs  T(C): 36.8 (05 Mar 2022 10:00), Max: 37.1 (05 Mar 2022 07:11)  T(F): 98.2 (05 Mar 2022 10:00), Max: 98.7 (05 Mar 2022 07:11)  HR: 90 (05 Mar 2022 10:00) (87 - 96)  BP: 120/80 (05 Mar 2022 10:00) (111/83 - 140/109)  BP(mean): 95 (05 Mar 2022 07:11) (95 - 95)  RR: 18 (05 Mar 2022 10:00) (18 - 20)  SpO2: 98% (05 Mar 2022 10:00) (94% - 100%)    Constitutional: Weak  appearing  HEENT: Atraumatic, DEMARCUS, Normal, No congestion  Respiratory: Breath Sounds normal, no rhonchi/wheeze  Cardiovascular: N S1S2;   Gastrointestinal: Abdomen soft, non tender, Bowel Sounds present  Extremities: No edema, peripheral pulses present  Neurological: AAO x 3, no gross focal motor deficits  Skin: Non cellulitic, no rash, ulcers  Lymph Nodes: No lymphadenopathy noted  Back: No CVA tenderness   Musculoskeletal: non tender  Breasts: Deferred  Genitourinary: deferred  Rectal: Deferred

## 2022-03-05 NOTE — H&P ADULT - NSHPLABSRESULTS_GEN_ALL_CORE
All Labs/EKG/Radiology/Meds reviewed by me.     Lab Results:  CBC  CBC Full  -  ( 05 Mar 2022 00:54 )  WBC Count : 15.54 K/uL  RBC Count : 5.17 M/uL  Hemoglobin : 14.2 g/dL  Hematocrit : 42.2 %  Platelet Count - Automated : 259 K/uL  Mean Cell Volume : 81.6 fl  Mean Cell Hemoglobin : 27.5 pg  Mean Cell Hemoglobin Concentration : 33.6 gm/dL  Auto Neutrophil # : 11.63 K/uL  Auto Lymphocyte # : 2.97 K/uL  Auto Monocyte # : 0.68 K/uL  Auto Eosinophil # : 0.11 K/uL  Auto Basophil # : 0.06 K/uL  Auto Neutrophil % : 74.8 %  Auto Lymphocyte % : 19.1 %  Auto Monocyte % : 4.4 %  Auto Eosinophil % : 0.7 %  Auto Basophil % : 0.4 %    .		Differential:	[] Automated		[] Manual  Chemistry                        14.2   15.54 )-----------( 259      ( 05 Mar 2022 00:54 )             42.2     03-05    140  |  106  |  20  ----------------------------<  199<H>  3.3<L>   |  28  |  1.05    Ca    9.4      05 Mar 2022 00:54    TPro  7.1  /  Alb  3.8  /  TBili  0.4  /  DBili  x   /  AST  27  /  ALT  45  /  AlkPhos  58  03-05    LIVER FUNCTIONS - ( 05 Mar 2022 00:54 )  Alb: 3.8 g/dL / Pro: 7.1 gm/dL / ALK PHOS: 58 U/L / ALT: 45 U/L / AST: 27 U/L / GGT: x           PT/INR - ( 05 Mar 2022 07:49 )   PT: 13.1 sec;   INR: 1.13 ratio         PTT - ( 05 Mar 2022 07:49 )  PTT:32.1 sec      ekg: nsr 86    RADIOLOGY RESULTS:    < from: CT Abdomen and Pelvis No Cont (03.02.22 @ 09:49) >    Mild left hydroureteronephrosis to the level of a 5 mm mid ureteral   calculus. Nonobstructing left intrarenal calculus.    2.3 cm partially calcified left midpole renal mass. Recommend comparison   with prior studies. Otherwise, recommend follow-up pre and postcontrast   CT with renal mass protocol.    < end of copied text >        MEDICATIONS  (STANDING):  cholecalciferol 2000 Unit(s) Oral daily  glatiramer Injectable 40 milliGRAM(s) SubCutaneous <User Schedule>  losartan 100 milliGRAM(s) Oral daily  pantoprazole    Tablet 40 milliGRAM(s) Oral before breakfast  potassium chloride  10 mEq/100 mL IVPB 10 milliEquivalent(s) IV Intermittent every 1 hour  potassium chloride  10 mEq/100 mL IVPB 10 milliEquivalent(s) IV Intermittent every 1 hour  sodium chloride 0.9%. 1000 milliLiter(s) (100 mL/Hr) IV Continuous <Continuous>  tamsulosin 0.4 milliGRAM(s) Oral at bedtime    MEDICATIONS  (PRN):  acetaminophen     Tablet .. 650 milliGRAM(s) Oral every 6 hours PRN Temp greater or equal to 38C (100.4F), Mild Pain (1 - 3)  aluminum hydroxide/magnesium hydroxide/simethicone Suspension 30 milliLiter(s) Oral every 4 hours PRN Dyspepsia  HYDROmorphone  Injectable 1 milliGRAM(s) IV Push every 4 hours PRN Moderate Pain (4 - 6)  melatonin 3 milliGRAM(s) Oral at bedtime PRN Insomnia  morphine  - Injectable 2 milliGRAM(s) IV Push every 4 hours PRN Severe Pain (7 - 10)  ondansetron Injectable 4 milliGRAM(s) IV Push every 6 hours PRN Nausea and/or Vomiting

## 2022-03-05 NOTE — ED PROVIDER NOTE - PROGRESS NOTE DETAILS
pt with persistent pain.   will give more pain meds and agree with plan for urology consult in the AM Jony MATUTE: Received s/o from Dr. Carlos- spoke with Dr. Carvalho; patient to go to OR today for ureteral stent; in setting of leukocytosis- antibiotics recommended; pre-op labs ordered; endorsed to Dr. Adorno for admission.

## 2022-03-05 NOTE — H&P ADULT - NSICDXFAMILYHX_GEN_ALL_CORE_FT
FAMILY HISTORY:  Father  Still living? Yes, Estimated age: Age Unknown  Family history of multiple myeloma, Age at diagnosis: Age Unknown  FH: lung cancer, Age at diagnosis: Age Unknown

## 2022-03-05 NOTE — H&P ADULT - HISTORY OF PRESENT ILLNESS
46/M with PMHX of HTN, MS, was brought to the ED for c/o Left flank pain, severe pain, started last night, associated with vomiting. Pt was recently in ED and Dx with left ureteral stone.  Pt seen by urology and going for ureteral stent today.

## 2022-03-05 NOTE — ED PROVIDER NOTE - OBJECTIVE STATEMENT
45 y/o male in ED c/o worsening n/v/left flank pain tonight.   pt states unable to tolerate PO.   states 5-6 episodes of emesis .   states seen in ED a few days ago for same and diagnosed with kidney stone.   pt states has appt with urologist next friday.   pt denies any fever, HA, p, sob,

## 2022-03-06 ENCOUNTER — TRANSCRIPTION ENCOUNTER (OUTPATIENT)
Age: 47
End: 2022-03-06

## 2022-03-06 VITALS
OXYGEN SATURATION: 96 % | TEMPERATURE: 98 F | DIASTOLIC BLOOD PRESSURE: 80 MMHG | SYSTOLIC BLOOD PRESSURE: 135 MMHG | RESPIRATION RATE: 16 BRPM | HEART RATE: 79 BPM

## 2022-03-06 LAB
ANION GAP SERPL CALC-SCNC: 4 MMOL/L — LOW (ref 5–17)
BUN SERPL-MCNC: 17 MG/DL — SIGNIFICANT CHANGE UP (ref 7–23)
CALCIUM SERPL-MCNC: 9.1 MG/DL — SIGNIFICANT CHANGE UP (ref 8.5–10.1)
CHLORIDE SERPL-SCNC: 108 MMOL/L — SIGNIFICANT CHANGE UP (ref 96–108)
CO2 SERPL-SCNC: 29 MMOL/L — SIGNIFICANT CHANGE UP (ref 22–31)
CREAT SERPL-MCNC: 0.8 MG/DL — SIGNIFICANT CHANGE UP (ref 0.5–1.3)
EGFR: 110 ML/MIN/1.73M2 — SIGNIFICANT CHANGE UP
GLUCOSE SERPL-MCNC: 110 MG/DL — HIGH (ref 70–99)
HCT VFR BLD CALC: 41.4 % — SIGNIFICANT CHANGE UP (ref 39–50)
HGB BLD-MCNC: 13.6 G/DL — SIGNIFICANT CHANGE UP (ref 13–17)
MCHC RBC-ENTMCNC: 27.5 PG — SIGNIFICANT CHANGE UP (ref 27–34)
MCHC RBC-ENTMCNC: 32.9 GM/DL — SIGNIFICANT CHANGE UP (ref 32–36)
MCV RBC AUTO: 83.6 FL — SIGNIFICANT CHANGE UP (ref 80–100)
PLATELET # BLD AUTO: 257 K/UL — SIGNIFICANT CHANGE UP (ref 150–400)
POTASSIUM SERPL-MCNC: 4 MMOL/L — SIGNIFICANT CHANGE UP (ref 3.5–5.3)
POTASSIUM SERPL-SCNC: 4 MMOL/L — SIGNIFICANT CHANGE UP (ref 3.5–5.3)
RBC # BLD: 4.95 M/UL — SIGNIFICANT CHANGE UP (ref 4.2–5.8)
RBC # FLD: 13.8 % — SIGNIFICANT CHANGE UP (ref 10.3–14.5)
SODIUM SERPL-SCNC: 141 MMOL/L — SIGNIFICANT CHANGE UP (ref 135–145)
WBC # BLD: 14.66 K/UL — HIGH (ref 3.8–10.5)
WBC # FLD AUTO: 14.66 K/UL — HIGH (ref 3.8–10.5)

## 2022-03-06 PROCEDURE — 99239 HOSP IP/OBS DSCHRG MGMT >30: CPT

## 2022-03-06 RX ORDER — CEFTRIAXONE 500 MG/1
1000 INJECTION, POWDER, FOR SOLUTION INTRAMUSCULAR; INTRAVENOUS EVERY 24 HOURS
Refills: 0 | Status: DISCONTINUED | OUTPATIENT
Start: 2022-03-06 | End: 2022-03-06

## 2022-03-06 RX ORDER — ACETAMINOPHEN 500 MG
2 TABLET ORAL
Qty: 0 | Refills: 0 | DISCHARGE
Start: 2022-03-06

## 2022-03-06 RX ORDER — TAMSULOSIN HYDROCHLORIDE 0.4 MG/1
1 CAPSULE ORAL
Qty: 30 | Refills: 0
Start: 2022-03-06 | End: 2022-04-04

## 2022-03-06 RX ORDER — PHENAZOPYRIDINE HCL 100 MG
1 TABLET ORAL
Qty: 9 | Refills: 0
Start: 2022-03-06 | End: 2022-03-08

## 2022-03-06 RX ORDER — CEFUROXIME AXETIL 250 MG
1 TABLET ORAL
Qty: 10 | Refills: 0
Start: 2022-03-06 | End: 2022-03-10

## 2022-03-06 RX ADMIN — Medication 100 MILLIGRAM(S): at 05:36

## 2022-03-06 RX ADMIN — PANTOPRAZOLE SODIUM 40 MILLIGRAM(S): 20 TABLET, DELAYED RELEASE ORAL at 09:07

## 2022-03-06 RX ADMIN — LOSARTAN POTASSIUM 100 MILLIGRAM(S): 100 TABLET, FILM COATED ORAL at 09:07

## 2022-03-06 RX ADMIN — CEFTRIAXONE 100 MILLIGRAM(S): 500 INJECTION, POWDER, FOR SOLUTION INTRAMUSCULAR; INTRAVENOUS at 11:41

## 2022-03-06 RX ADMIN — Medication 2000 UNIT(S): at 09:07

## 2022-03-06 NOTE — DISCHARGE NOTE PROVIDER - NSDCMRMEDTOKEN_GEN_ALL_CORE_FT
acetaminophen 325 mg oral tablet: 2 tab(s) orally every 6 hours, As needed, Temp greater or equal to 38C (100.4F), Mild Pain (1 - 3)  cefuroxime 500 mg oral tablet: 1 tab(s) orally 2 times a day   Copaxone 40 mg/mL subcutaneous solution: 40 milligram(s) subcutaneous Monday, Wednesday, and Friday  Flomax 0.4 mg oral capsule: 1 cap(s) orally once a day   losartan 100 mg oral tablet: 1 tab(s) orally once a day  NexIUM 40 mg oral delayed release capsule: 1 cap(s) orally once a day  Pyridium 100 mg oral tablet: 1 tab(s) orally 3 times a day   Vitamin D3 25 mcg (1000 intl units) oral tablet: 1 tab(s) orally once a day

## 2022-03-06 NOTE — DISCHARGE NOTE PROVIDER - HOSPITAL COURSE
Patient presented with LT renal colic. Underwent cystoscopy with LT ureteral stent placement. Feels good after. Stable for discharge for outpatient urology follow up

## 2022-03-06 NOTE — DISCHARGE NOTE NURSING/CASE MANAGEMENT/SOCIAL WORK - PATIENT PORTAL LINK FT
You can access the FollowMyHealth Patient Portal offered by [facility] by registering at the following website: [email or web address]. By joining [facility]’s FollowMyHealth portal, you will also be able to view your health information using other applications (apps) compatible with our system.

## 2022-03-06 NOTE — DISCHARGE NOTE PROVIDER - CARE PROVIDER_API CALL
Kevin Carvalho)  Urology  [address], [address] [place] [address]  Phone: [phone]  Fax: [phone]  Follow Up Time: 2 weeks

## 2022-03-06 NOTE — DISCHARGE NOTE PROVIDER - NSDCCPCAREPLAN_GEN_ALL_CORE_FT
PRINCIPAL DISCHARGE DIAGNOSIS  Diagnosis: Renal colic  Assessment and Plan of Treatment: S/p stent placement- PO abx. Outpatient f/u with DR Carvalho      SECONDARY DISCHARGE DIAGNOSES  Diagnosis: Left renal mass  Assessment and Plan of Treatment: Outpatient work up with DR Carvalho

## 2022-03-06 NOTE — PROGRESS NOTE ADULT - SUBJECTIVE AND OBJECTIVE BOX
CC- left renal colic    HPI:  46/M with PMHX of HTN, MS, was brought to the ED for c/o Left flank pain, severe pain, started last night, associated with vomiting. Pt was recently in ED and Dx with left ureteral stone.  Pt seen by urology and going for ureteral stent today.  (05 Mar 2022 10:44)    3/6/22- s/p cystoscopy and LT ureteral stent placement yesterday. Feels better today. No pain. Wants to go home    Review of system- All 10 systems reviewed and is as per HPI otherwise negative.     T(C): 36.7 (03-06-22 @ 09:06), Max: 36.9 (03-05-22 @ 19:50)  HR: 79 (03-06-22 @ 09:06) (79 - 98)  BP: 135/80 (03-06-22 @ 09:06) (101/62 - 426/84)  RR: 16 (03-06-22 @ 09:06) (10 - 18)  SpO2: 96% (03-06-22 @ 09:06) (93% - 100%)  Wt(kg): --    LABS:                        13.6   14.66 )-----------( 257      ( 06 Mar 2022 08:10 )             41.4     03-06    141  |  108  |  17  ----------------------------<  110<H>  4.0   |  29  |  0.80    Ca    9.1      06 Mar 2022 08:10    TPro  7.1  /  Alb  3.8  /  TBili  0.4  /  DBili  x   /  AST  27  /  ALT  45  /  AlkPhos  58  03-05    PT/INR - ( 05 Mar 2022 07:49 )   PT: 13.1 sec;   INR: 1.13 ratio       PTT - ( 05 Mar 2022 07:49 )  PTT:32.1 sec    RADIOLOGY & ADDITIONAL TESTS:  CT ABDOMEN AND PELVIS                        PROCEDURE DATE:  03/02/2022      INTERPRETATION:  CLINICAL INFORMATION: 46 years  Male with flank pain.    COMPARISON: None.    CONTRAST/COMPLICATIONS:  IV Contrast: None  OralContrast: None  Complications: None    PROCEDURE:  CT of the Abdomen and Pelvis was performed in the prone position.  Sagittal and coronal reformats were performed.    LIMITATIONS: Evaluation of the solid organs, vascular structures and GI   tract islimited without oral and IV contrast. Hepatic dome partially   omitted.    FINDINGS:  LOWER CHEST: Within normal limits.    LIVER: Steatosis.  BILE DUCTS: Normal caliber.  GALLBLADDER: Within normal limits.  SPLEEN: Within normal limits.  PANCREAS: Within normal limits.  ADRENALS: Within normal limits.  KIDNEYS/URETERS: Mild left hydroureteronephrosis to the level of a 5 mm   mid ureteral calculus. 2.3 cm left midpole partially calcified mass 9 mm   nonobstructing left intrarenal calculus. No right hydroureteronephrosis   or calculi.    BLADDER: Within normal limits.  REPRODUCTIVE ORGANS:    BOWEL: No bowel obstruction. Appendix is normal.  PERITONEUM: No ascites.  VESSELS: Within normal limits.  RETROPERITONEUM/LYMPH NODES: No lymphadenopathy.  ABDOMINAL WALL: Subcutaneous fat infiltration in the anterior abdominal   wall either dependent edema or possibly injection sites.  BONES: Mild thoracic degenerative changes.    IMPRESSION:  Mild left hydroureteronephrosis to the level of a 5 mm mid ureteral   calculus. Nonobstructing left intrarenal calculus.    2.3 cm partially calcified left midpole renal mass. Recommend comparison   with prior studies. Otherwise, recommend follow-up pre and postcontrast   CT with renal mass protocol.    PHYSICAL EXAM:  GENERAL: NAD, well-groomed, well-developed  HEAD:  Atraumatic, Normocephalic  EYES: EOMI, PERRLA, conjunctiva and sclera clear  HEENT: Moist mucous membranes  NECK: Supple, No JVD  NERVOUS SYSTEM:  Alert & Oriented X3, Motor Strength 5/5 B/L upper and lower extremities; DTRs 2+ intact and symmetric  CHEST/LUNG: Clear to auscultation bilaterally; No rales, rhonchi, wheezing, or rubs  HEART: Regular rate and rhythm; No murmurs, rubs, or gallops  ABDOMEN: Soft, Nontender, Nondistended; Bowel sounds present  GENITOURINARY- Voiding, no palpable bladder  EXTREMITIES:  2+ Peripheral Pulses, No clubbing, cyanosis, or edema  MUSCULOSKELTAL- No muscle tenderness, Muscle tone normal, No joint tenderness, no Joint swelling, Joint range of motion-normal  SKIN-no rash, no lesion  CNS- alert, oriented X3, non focal     MEDICATIONS  (STANDING):  cefTRIAXone   IVPB 1000 milliGRAM(s) IV Intermittent every 24 hours  cholecalciferol 2000 Unit(s) Oral daily  glatiramer Injectable 40 milliGRAM(s) SubCutaneous <User Schedule>  lactated ringers. 1000 milliLiter(s) (75 mL/Hr) IV Continuous <Continuous>  losartan 100 milliGRAM(s) Oral daily  pantoprazole    Tablet 40 milliGRAM(s) Oral daily  phenazopyridine 100 milliGRAM(s) Oral every 8 hours  sodium chloride 0.9%. 1000 milliLiter(s) (100 mL/Hr) IV Continuous <Continuous>  tamsulosin 0.4 milliGRAM(s) Oral at bedtime    MEDICATIONS  (PRN):  acetaminophen     Tablet .. 650 milliGRAM(s) Oral every 6 hours PRN Temp greater or equal to 38C (100.4F), Mild Pain (1 - 3)  aluminum hydroxide/magnesium hydroxide/simethicone Suspension 30 milliLiter(s) Oral every 4 hours PRN Dyspepsia  fentaNYL    Injectable 50 MICROGram(s) IV Push every 5 minutes PRN Severe Pain (7 - 10)  HYDROmorphone  Injectable 1 milliGRAM(s) IV Push every 4 hours PRN Moderate Pain (4 - 6)  melatonin 3 milliGRAM(s) Oral at bedtime PRN Insomnia  morphine  - Injectable 2 milliGRAM(s) IV Push every 4 hours PRN Severe Pain (7 - 10)  ondansetron Injectable 4 milliGRAM(s) IV Push every 6 hours PRN Nausea and/or Vomiting  ondansetron Injectable 4 milliGRAM(s) IV Push once PRN Nausea and/or Vomiting  oxyCODONE    IR 10 milliGRAM(s) Oral once PRN Severe Pain (7 - 10)    Assessment/Plan  #LT renal colic 2 to obstructing stone with LT hydroureteronephrosis  #S/p cystoscopy with LT stent placement  Feels better today  Urology eval and recommendations noted  Will give 1g of Rocephin now and discharge on Ceftin 500mg BID x5 days  Scripts for Flomax and Pyridium sent to the pharmacy    #2.3 cm partially calcified left midpole renal mass. r/o malignancy  Outpatient urology follow up    #Hypokalemia- repleted    #HTN- cont home meds    #MS: cont copaxone on MWF    #Dispo- home today. DC time 45 mins

## 2022-03-07 PROBLEM — Z00.00 ENCOUNTER FOR PREVENTIVE HEALTH EXAMINATION: Status: ACTIVE | Noted: 2022-03-07

## 2022-03-10 ENCOUNTER — NON-APPOINTMENT (OUTPATIENT)
Age: 47
End: 2022-03-10

## 2022-03-10 LAB
CULTURE RESULTS: SIGNIFICANT CHANGE UP
CULTURE RESULTS: SIGNIFICANT CHANGE UP
SPECIMEN SOURCE: SIGNIFICANT CHANGE UP
SPECIMEN SOURCE: SIGNIFICANT CHANGE UP

## 2022-03-11 ENCOUNTER — APPOINTMENT (OUTPATIENT)
Dept: UROLOGY | Facility: CLINIC | Age: 47
End: 2022-03-11

## 2022-03-14 ENCOUNTER — NON-APPOINTMENT (OUTPATIENT)
Age: 47
End: 2022-03-14

## 2022-03-14 ENCOUNTER — OUTPATIENT (OUTPATIENT)
Dept: OUTPATIENT SERVICES | Facility: HOSPITAL | Age: 47
LOS: 1 days | End: 2022-03-14
Payer: COMMERCIAL

## 2022-03-14 ENCOUNTER — RESULT REVIEW (OUTPATIENT)
Age: 47
End: 2022-03-14

## 2022-03-14 VITALS
HEART RATE: 90 BPM | TEMPERATURE: 98 F | DIASTOLIC BLOOD PRESSURE: 90 MMHG | WEIGHT: 300.05 LBS | RESPIRATION RATE: 18 BRPM | OXYGEN SATURATION: 100 % | HEIGHT: 72 IN | SYSTOLIC BLOOD PRESSURE: 155 MMHG

## 2022-03-14 DIAGNOSIS — Z01.818 ENCOUNTER FOR OTHER PREPROCEDURAL EXAMINATION: ICD-10-CM

## 2022-03-14 DIAGNOSIS — N13.2 HYDRONEPHROSIS WITH RENAL AND URETERAL CALCULOUS OBSTRUCTION: ICD-10-CM

## 2022-03-14 DIAGNOSIS — Z98.890 OTHER SPECIFIED POSTPROCEDURAL STATES: Chronic | ICD-10-CM

## 2022-03-14 DIAGNOSIS — N20.1 CALCULUS OF URETER: ICD-10-CM

## 2022-03-14 DIAGNOSIS — G35 MULTIPLE SCLEROSIS: ICD-10-CM

## 2022-03-14 DIAGNOSIS — Z96.0 PRESENCE OF UROGENITAL IMPLANTS: Chronic | ICD-10-CM

## 2022-03-14 DIAGNOSIS — E87.6 HYPOKALEMIA: ICD-10-CM

## 2022-03-14 LAB
ANION GAP SERPL CALC-SCNC: 4 MMOL/L — LOW (ref 5–17)
APPEARANCE UR: ABNORMAL
APTT BLD: 35.7 SEC — HIGH (ref 27.5–35.5)
BASOPHILS # BLD AUTO: 0.06 K/UL — SIGNIFICANT CHANGE UP (ref 0–0.2)
BASOPHILS NFR BLD AUTO: 0.6 % — SIGNIFICANT CHANGE UP (ref 0–2)
BILIRUB UR-MCNC: NEGATIVE — SIGNIFICANT CHANGE UP
BUN SERPL-MCNC: 16 MG/DL — SIGNIFICANT CHANGE UP (ref 7–23)
CALCIUM SERPL-MCNC: 9.3 MG/DL — SIGNIFICANT CHANGE UP (ref 8.5–10.1)
CHLORIDE SERPL-SCNC: 107 MMOL/L — SIGNIFICANT CHANGE UP (ref 96–108)
CO2 SERPL-SCNC: 28 MMOL/L — SIGNIFICANT CHANGE UP (ref 22–31)
COLOR SPEC: ABNORMAL
CREAT SERPL-MCNC: 0.89 MG/DL — SIGNIFICANT CHANGE UP (ref 0.5–1.3)
DIFF PNL FLD: ABNORMAL
EGFR: 106 ML/MIN/1.73M2 — SIGNIFICANT CHANGE UP
EOSINOPHIL # BLD AUTO: 0.29 K/UL — SIGNIFICANT CHANGE UP (ref 0–0.5)
EOSINOPHIL NFR BLD AUTO: 2.8 % — SIGNIFICANT CHANGE UP (ref 0–6)
GLUCOSE SERPL-MCNC: 135 MG/DL — HIGH (ref 70–99)
GLUCOSE UR QL: NEGATIVE — SIGNIFICANT CHANGE UP
HCT VFR BLD CALC: 45.2 % — SIGNIFICANT CHANGE UP (ref 39–50)
HGB BLD-MCNC: 14.9 G/DL — SIGNIFICANT CHANGE UP (ref 13–17)
IMM GRANULOCYTES NFR BLD AUTO: 0.4 % — SIGNIFICANT CHANGE UP (ref 0–1.5)
INR BLD: 1 RATIO — SIGNIFICANT CHANGE UP (ref 0.88–1.16)
KETONES UR-MCNC: NEGATIVE — SIGNIFICANT CHANGE UP
LEUKOCYTE ESTERASE UR-ACNC: ABNORMAL
LYMPHOCYTES # BLD AUTO: 29.4 % — SIGNIFICANT CHANGE UP (ref 13–44)
LYMPHOCYTES # BLD AUTO: 3.01 K/UL — SIGNIFICANT CHANGE UP (ref 1–3.3)
MCHC RBC-ENTMCNC: 27.4 PG — SIGNIFICANT CHANGE UP (ref 27–34)
MCHC RBC-ENTMCNC: 33 GM/DL — SIGNIFICANT CHANGE UP (ref 32–36)
MCV RBC AUTO: 83.1 FL — SIGNIFICANT CHANGE UP (ref 80–100)
MONOCYTES # BLD AUTO: 0.49 K/UL — SIGNIFICANT CHANGE UP (ref 0–0.9)
MONOCYTES NFR BLD AUTO: 4.8 % — SIGNIFICANT CHANGE UP (ref 2–14)
NEUTROPHILS # BLD AUTO: 6.36 K/UL — SIGNIFICANT CHANGE UP (ref 1.8–7.4)
NEUTROPHILS NFR BLD AUTO: 62 % — SIGNIFICANT CHANGE UP (ref 43–77)
NITRITE UR-MCNC: NEGATIVE — SIGNIFICANT CHANGE UP
PH UR: 6 — SIGNIFICANT CHANGE UP (ref 5–8)
PLATELET # BLD AUTO: 325 K/UL — SIGNIFICANT CHANGE UP (ref 150–400)
POTASSIUM SERPL-MCNC: 3.5 MMOL/L — SIGNIFICANT CHANGE UP (ref 3.5–5.3)
POTASSIUM SERPL-SCNC: 3.5 MMOL/L — SIGNIFICANT CHANGE UP (ref 3.5–5.3)
PROT UR-MCNC: 100
PROTHROM AB SERPL-ACNC: 11.6 SEC — SIGNIFICANT CHANGE UP (ref 10.5–13.4)
RBC # BLD: 5.44 M/UL — SIGNIFICANT CHANGE UP (ref 4.2–5.8)
RBC # FLD: 13.9 % — SIGNIFICANT CHANGE UP (ref 10.3–14.5)
SODIUM SERPL-SCNC: 139 MMOL/L — SIGNIFICANT CHANGE UP (ref 135–145)
SP GR SPEC: 1.02 — SIGNIFICANT CHANGE UP (ref 1.01–1.02)
UROBILINOGEN FLD QL: NEGATIVE — SIGNIFICANT CHANGE UP
WBC # BLD: 10.25 K/UL — SIGNIFICANT CHANGE UP (ref 3.8–10.5)
WBC # FLD AUTO: 10.25 K/UL — SIGNIFICANT CHANGE UP (ref 3.8–10.5)

## 2022-03-14 PROCEDURE — G0463: CPT | Mod: 25

## 2022-03-14 PROCEDURE — 85025 COMPLETE CBC W/AUTO DIFF WBC: CPT

## 2022-03-14 PROCEDURE — 85610 PROTHROMBIN TIME: CPT

## 2022-03-14 PROCEDURE — 80048 BASIC METABOLIC PNL TOTAL CA: CPT

## 2022-03-14 PROCEDURE — 71046 X-RAY EXAM CHEST 2 VIEWS: CPT | Mod: 26

## 2022-03-14 PROCEDURE — 87086 URINE CULTURE/COLONY COUNT: CPT

## 2022-03-14 PROCEDURE — 71046 X-RAY EXAM CHEST 2 VIEWS: CPT

## 2022-03-14 PROCEDURE — 85730 THROMBOPLASTIN TIME PARTIAL: CPT

## 2022-03-14 PROCEDURE — 36415 COLL VENOUS BLD VENIPUNCTURE: CPT

## 2022-03-14 PROCEDURE — 93010 ELECTROCARDIOGRAM REPORT: CPT

## 2022-03-14 PROCEDURE — 81001 URINALYSIS AUTO W/SCOPE: CPT

## 2022-03-14 PROCEDURE — 86850 RBC ANTIBODY SCREEN: CPT

## 2022-03-14 PROCEDURE — 86900 BLOOD TYPING SEROLOGIC ABO: CPT

## 2022-03-14 PROCEDURE — 93005 ELECTROCARDIOGRAM TRACING: CPT

## 2022-03-14 PROCEDURE — 86901 BLOOD TYPING SEROLOGIC RH(D): CPT

## 2022-03-14 NOTE — H&P PST ADULT - NSICDXFAMILYHX_GEN_ALL_CORE_FT
FAMILY HISTORY:  Father  Still living? Yes, Estimated age: Age Unknown  Family history of heart disease, Age at diagnosis: Age Unknown  Family history of multiple myeloma, Age at diagnosis: Age Unknown  FH: lung cancer, Age at diagnosis: Age Unknown

## 2022-03-14 NOTE — H&P PST ADULT - ASSESSMENT
45 y/o male with left ureteral stone. Pt reports he was in ER on 03/05/2022 due to left flank pain and hematuria, he had a ureteral stent placed that day. He is scheduled for Cystoscopy, ureteroscopy, lithotripsy of stone with ureteral stent placement.   Plan  1. Stop all NSAIDS, herbal supplements and vitamins for 7 days.  2. NPO  as per ASU instructions.  3. COVID test on 03/21/2022, pt instructed.   4. Labs, EKG , CXR as per surgeon.  5. PMD visit for optimization prior to surgery as per surgeon           45 y/o male with left ureteral stone. Pt reports he was in ER on 03/05/2022 due to left flank pain and hematuria, he had a ureteral stent placed that day. He is scheduled for Cystoscopy, ureteroscopy, lithotripsy of stone with ureteral stent exchange.   Plan  1. Stop all NSAIDS, herbal supplements and vitamins for 7 days.  2. NPO  as per ASU instructions.  3. COVID test on 03/21/2022, pt instructed.   4. Labs, EKG , CXR as per surgeon.  5. PMD visit for optimization prior to surgery as per surgeon

## 2022-03-14 NOTE — H&P PST ADULT - NSICDXPASTMEDICALHX_GEN_ALL_CORE_FT
PAST MEDICAL HISTORY:  GERD (gastroesophageal reflux disease)     HTN (hypertension)     Left ureteral stone     Multiple sclerosis     Obesity

## 2022-03-14 NOTE — H&P PST ADULT - FALL HARM RISK - HARM RISK INTERVENTIONS

## 2022-03-14 NOTE — H&P PST ADULT - HISTORY OF PRESENT ILLNESS
47 y/o male with left ureteral stone. Pt reports he was in ER on 03/05/2022 due to left flank pain and hematuria, he had a ureteral stent placed that day. He is here for PST for planned Cystoscopy, ureteroscopy, lithotripsy of stone with ureteral stent placement.  45 y/o male with left ureteral stone. Pt reports he was in ER on 03/05/2022 due to left flank pain and hematuria, he had a ureteral stent placed that day. He is here for PST for planned Cystoscopy, ureteroscopy, lithotripsy of stone with ureteral stent exchange.

## 2022-03-14 NOTE — H&P PST ADULT - NSICDXPASTSURGICALHX_GEN_ALL_CORE_FT
PAST SURGICAL HISTORY:  S/P left knee arthroscopy 2007    S/P ureteral stent placement left 2022

## 2022-03-15 DIAGNOSIS — Z01.818 ENCOUNTER FOR OTHER PREPROCEDURAL EXAMINATION: ICD-10-CM

## 2022-03-15 DIAGNOSIS — N20.1 CALCULUS OF URETER: ICD-10-CM

## 2022-03-15 LAB
CULTURE RESULTS: NO GROWTH — SIGNIFICANT CHANGE UP
SPECIMEN SOURCE: SIGNIFICANT CHANGE UP

## 2022-03-23 PROBLEM — N20.1 CALCULUS OF URETER: Chronic | Status: ACTIVE | Noted: 2022-03-14

## 2022-03-23 PROBLEM — E66.9 OBESITY, UNSPECIFIED: Chronic | Status: ACTIVE | Noted: 2022-03-14

## 2022-03-23 PROBLEM — K21.9 GASTRO-ESOPHAGEAL REFLUX DISEASE WITHOUT ESOPHAGITIS: Chronic | Status: ACTIVE | Noted: 2022-03-14

## 2022-03-23 RX ORDER — ONDANSETRON 8 MG/1
4 TABLET, FILM COATED ORAL ONCE
Refills: 0 | Status: DISCONTINUED | OUTPATIENT
Start: 2022-03-24 | End: 2022-03-24

## 2022-03-23 RX ORDER — FENTANYL CITRATE 50 UG/ML
50 INJECTION INTRAVENOUS
Refills: 0 | Status: DISCONTINUED | OUTPATIENT
Start: 2022-03-24 | End: 2022-03-24

## 2022-03-23 RX ORDER — SODIUM CHLORIDE 9 MG/ML
1000 INJECTION, SOLUTION INTRAVENOUS
Refills: 0 | Status: DISCONTINUED | OUTPATIENT
Start: 2022-03-24 | End: 2022-03-24

## 2022-03-24 ENCOUNTER — APPOINTMENT (OUTPATIENT)
Dept: UROLOGY | Facility: HOSPITAL | Age: 47
End: 2022-03-24

## 2022-03-24 ENCOUNTER — OUTPATIENT (OUTPATIENT)
Dept: INPATIENT UNIT | Facility: HOSPITAL | Age: 47
LOS: 1 days | Discharge: HOME | End: 2022-03-24
Payer: COMMERCIAL

## 2022-03-24 ENCOUNTER — TRANSCRIPTION ENCOUNTER (OUTPATIENT)
Age: 47
End: 2022-03-24

## 2022-03-24 ENCOUNTER — RESULT REVIEW (OUTPATIENT)
Age: 47
End: 2022-03-24

## 2022-03-24 VITALS
DIASTOLIC BLOOD PRESSURE: 92 MMHG | TEMPERATURE: 97 F | RESPIRATION RATE: 16 BRPM | WEIGHT: 300.05 LBS | SYSTOLIC BLOOD PRESSURE: 135 MMHG | OXYGEN SATURATION: 96 % | HEART RATE: 92 BPM

## 2022-03-24 VITALS
RESPIRATION RATE: 16 BRPM | HEART RATE: 82 BPM | DIASTOLIC BLOOD PRESSURE: 85 MMHG | OXYGEN SATURATION: 97 % | TEMPERATURE: 98 F | SYSTOLIC BLOOD PRESSURE: 131 MMHG

## 2022-03-24 DIAGNOSIS — Z96.0 PRESENCE OF UROGENITAL IMPLANTS: Chronic | ICD-10-CM

## 2022-03-24 DIAGNOSIS — Z98.890 OTHER SPECIFIED POSTPROCEDURAL STATES: Chronic | ICD-10-CM

## 2022-03-24 DIAGNOSIS — N20.1 CALCULUS OF URETER: ICD-10-CM

## 2022-03-24 PROCEDURE — C1894: CPT

## 2022-03-24 PROCEDURE — 76000 FLUOROSCOPY <1 HR PHYS/QHP: CPT

## 2022-03-24 PROCEDURE — 88300 SURGICAL PATH GROSS: CPT | Mod: 26

## 2022-03-24 PROCEDURE — 82365 CALCULUS SPECTROSCOPY: CPT

## 2022-03-24 PROCEDURE — 88300 SURGICAL PATH GROSS: CPT

## 2022-03-24 PROCEDURE — C1769: CPT

## 2022-03-24 PROCEDURE — C1889: CPT

## 2022-03-24 PROCEDURE — 52356 CYSTO/URETERO W/LITHOTRIPSY: CPT | Mod: 22,LT

## 2022-03-24 PROCEDURE — C2617: CPT

## 2022-03-24 RX ORDER — PHENAZOPYRIDINE HCL 100 MG
200 TABLET ORAL ONCE
Refills: 0 | Status: COMPLETED | OUTPATIENT
Start: 2022-03-24 | End: 2022-03-24

## 2022-03-24 RX ORDER — OXYCODONE HYDROCHLORIDE 5 MG/1
5 TABLET ORAL ONCE
Refills: 0 | Status: DISCONTINUED | OUTPATIENT
Start: 2022-03-24 | End: 2022-03-24

## 2022-03-24 RX ORDER — KETOROLAC TROMETHAMINE 30 MG/ML
1 SYRINGE (ML) INJECTION
Qty: 9 | Refills: 0
Start: 2022-03-24 | End: 2022-03-26

## 2022-03-24 RX ORDER — PHENAZOPYRIDINE HCL 100 MG
1 TABLET ORAL
Qty: 9 | Refills: 0
Start: 2022-03-24 | End: 2022-03-26

## 2022-03-24 RX ORDER — KETOROLAC TROMETHAMINE 30 MG/ML
15 SYRINGE (ML) INJECTION ONCE
Refills: 0 | Status: DISCONTINUED | OUTPATIENT
Start: 2022-03-24 | End: 2022-03-24

## 2022-03-24 RX ORDER — LOSARTAN POTASSIUM 100 MG/1
1 TABLET, FILM COATED ORAL
Qty: 0 | Refills: 0 | DISCHARGE

## 2022-03-24 RX ORDER — TAMSULOSIN HYDROCHLORIDE 0.4 MG/1
1 CAPSULE ORAL
Qty: 15 | Refills: 0
Start: 2022-03-24 | End: 2022-04-07

## 2022-03-24 RX ADMIN — Medication 15 MILLIGRAM(S): at 10:29

## 2022-03-24 RX ADMIN — Medication 15 MILLIGRAM(S): at 10:34

## 2022-03-24 RX ADMIN — OXYCODONE HYDROCHLORIDE 5 MILLIGRAM(S): 5 TABLET ORAL at 11:18

## 2022-03-24 RX ADMIN — Medication 200 MILLIGRAM(S): at 10:29

## 2022-03-24 RX ADMIN — SODIUM CHLORIDE 100 MILLILITER(S): 9 INJECTION, SOLUTION INTRAVENOUS at 10:31

## 2022-03-24 NOTE — ASU PATIENT PROFILE, ADULT - MEDICATION ADMINISTRATION INFO, PROFILE
Patient is a 65y old  Male who presents with a chief complaint of weakness  Hyperkalemia  VT (21 Oct 2021 12:07)        Over Night Events: Pt has been coughing up copious sputum.         ROS:     All ROS are negative except HPI         PHYSICAL EXAM    ICU Vital Signs Last 24 Hrs  T(C): 35.3 (21 Oct 2021 13:46), Max: 35.9 (20 Oct 2021 23:13)  T(F): 95.5 (21 Oct 2021 13:46), Max: 96.7 (21 Oct 2021 04:51)  HR: 91 (21 Oct 2021 13:46) (75 - 91)  BP: 111/78 (21 Oct 2021 13:46) (96/65 - 111/78)  BP(mean): 79 (20 Oct 2021 23:13) (79 - 79)  ABP: --  ABP(mean): --  RR: 20 (21 Oct 2021 13:46) (20 - 20)  SpO2: 97% (21 Oct 2021 13:46) (97% - 100%)      CONSTITUTIONAL:  ill appearing    ENT:   Airway patent,   Mouth with normal mucosa.   No thrush    EYES:   Pupils equal,   Round and reactive to light.    CARDIAC:   Normal rate,   Regular rhythm.    No edema        RESPIRATORY:   No wheezing  Bilateral BS  Normal chest expansion  Not tachypneic,  No use of accessory muscles    GASTROINTESTINAL:  Abdomen soft,   Non-tender,   No guarding,   + BS      NEUROLOGICAL:   Alert and oriented   No motor  deficits.    SKIN:   Skin normal color for race,   Warm and dry and intact.   No evidence of rash.      10-20-21 @ 07:01  -  10-21-21 @ 07:00  --------------------------------------------------------  IN:    Albumin 5%  - 500 mL: 1500 mL    dextrose 5%: 700 mL    IV PiggyBack: 150 mL  Total IN: 2350 mL    OUT:    Drain (mL): 2000 mL    Indwelling Catheter - Urethral (mL): 550 mL    Voided (mL): 350 mL  Total OUT: 2900 mL    Total NET: -550 mL          LABS:                            9.0    15.30 )-----------( 87       ( 21 Oct 2021 06:19 )             30.4                                               10-21    151<H>  |  115<H>  |  96<HH>  ----------------------------<  81  3.5   |  22  |  1.6<H>    Ca    7.9<L>      21 Oct 2021 06:19  Mg     2.5     10-21    TPro  6.4  /  Alb  4.0  /  TBili  1.5<H>  /  DBili  x   /  AST  49<H>  /  ALT  23  /  AlkPhos  134<H>  10-20                                                                                           LIVER FUNCTIONS - ( 20 Oct 2021 07:54 )  Alb: 4.0 g/dL / Pro: 6.4 g/dL / ALK PHOS: 134 U/L / ALT: 23 U/L / AST: 49 U/L / GGT: x                                                                                                                                       MEDICATIONS  (STANDING):  budesonide  80 MICROgram(s)/formoterol 4.5 MICROgram(s) Inhaler 2 Puff(s) Inhalation two times a day  calcium acetate 667 milliGRAM(s) Oral three times a day with meals  chlorhexidine 4% Liquid 1 Application(s) Topical daily  dextrose 40% Gel 15 Gram(s) Oral once  dextrose 5%. 1000 milliLiter(s) (50 mL/Hr) IV Continuous <Continuous>  dextrose 5%. 1000 milliLiter(s) (100 mL/Hr) IV Continuous <Continuous>  dextrose 5%. 1000 milliLiter(s) (50 mL/Hr) IV Continuous <Continuous>  dextrose 50% Injectable 25 Gram(s) IV Push once  dextrose 50% Injectable 12.5 Gram(s) IV Push once  dextrose 50% Injectable 25 Gram(s) IV Push once  glucagon  Injectable 1 milliGRAM(s) IntraMuscular once  heparin   Injectable 5000 Unit(s) SubCutaneous every 8 hours  insulin glargine Injectable (LANTUS) 20 Unit(s) SubCutaneous at bedtime  insulin lispro (ADMELOG) corrective regimen sliding scale   SubCutaneous three times a day before meals  insulin lispro Injectable (ADMELOG) 5 Unit(s) SubCutaneous three times a day before meals  lactulose Syrup 15 Gram(s) Oral three times a day  levoFLOXacin IVPB 750 milliGRAM(s) IV Intermittent every 48 hours  midodrine 10 milliGRAM(s) Oral every 8 hours  pantoprazole  Injectable 40 milliGRAM(s) IV Push every 12 hours  propranolol 5 milliGRAM(s) Oral daily  rifAXIMin 550 milliGRAM(s) Oral two times a day    MEDICATIONS  (PRN):      New X-rays reviewed:                                                                                  ECHO    CXR interpreted by me:  B/l Infiltrates     Patient is a 65y old  Male who presents with a chief complaint of weakness  Hyperkalemia  VT (21 Oct 2021 12:07)        Over Night Events:  Coughed up a mucous plug and feels much better       ROS:     All ROS are negative except HPI         PHYSICAL EXAM    ICU Vital Signs Last 24 Hrs  T(C): 35.3 (21 Oct 2021 13:46), Max: 35.9 (20 Oct 2021 23:13)  T(F): 95.5 (21 Oct 2021 13:46), Max: 96.7 (21 Oct 2021 04:51)  HR: 91 (21 Oct 2021 13:46) (75 - 91)  BP: 111/78 (21 Oct 2021 13:46) (96/65 - 111/78)  BP(mean): 79 (20 Oct 2021 23:13) (79 - 79)  ABP: --  ABP(mean): --  RR: 20 (21 Oct 2021 13:46) (20 - 20)  SpO2: 97% (21 Oct 2021 13:46) (97% - 100%)      CONSTITUTIONAL:  ill appearing    ENT:   Airway patent,   Mouth with normal mucosa.   No thrush    EYES:   Pupils equal,   Round and reactive to light.    CARDIAC:   Normal rate,   Regular rhythm.    No edema        RESPIRATORY:   No wheezing  Bilateral BS  Normal chest expansion  Not tachypneic,  No use of accessory muscles    GASTROINTESTINAL:  Abdomen soft,   Non-tender,   No guarding,   + BS      NEUROLOGICAL:   Alert and oriented   No motor  deficits.    SKIN:   Skin normal color for race,   Warm and dry and intact.   No evidence of rash.      10-20-21 @ 07:01  -  10-21-21 @ 07:00  --------------------------------------------------------  IN:    Albumin 5%  - 500 mL: 1500 mL    dextrose 5%: 700 mL    IV PiggyBack: 150 mL  Total IN: 2350 mL    OUT:    Drain (mL): 2000 mL    Indwelling Catheter - Urethral (mL): 550 mL    Voided (mL): 350 mL  Total OUT: 2900 mL    Total NET: -550 mL          LABS:                            9.0    15.30 )-----------( 87       ( 21 Oct 2021 06:19 )             30.4                                               10-21    151<H>  |  115<H>  |  96<HH>  ----------------------------<  81  3.5   |  22  |  1.6<H>    Ca    7.9<L>      21 Oct 2021 06:19  Mg     2.5     10-21    TPro  6.4  /  Alb  4.0  /  TBili  1.5<H>  /  DBili  x   /  AST  49<H>  /  ALT  23  /  AlkPhos  134<H>  10-20                                                                                           LIVER FUNCTIONS - ( 20 Oct 2021 07:54 )  Alb: 4.0 g/dL / Pro: 6.4 g/dL / ALK PHOS: 134 U/L / ALT: 23 U/L / AST: 49 U/L / GGT: x                                                                                                                                       MEDICATIONS  (STANDING):  budesonide  80 MICROgram(s)/formoterol 4.5 MICROgram(s) Inhaler 2 Puff(s) Inhalation two times a day  calcium acetate 667 milliGRAM(s) Oral three times a day with meals  chlorhexidine 4% Liquid 1 Application(s) Topical daily  dextrose 40% Gel 15 Gram(s) Oral once  dextrose 5%. 1000 milliLiter(s) (50 mL/Hr) IV Continuous <Continuous>  dextrose 5%. 1000 milliLiter(s) (100 mL/Hr) IV Continuous <Continuous>  dextrose 5%. 1000 milliLiter(s) (50 mL/Hr) IV Continuous <Continuous>  dextrose 50% Injectable 25 Gram(s) IV Push once  dextrose 50% Injectable 12.5 Gram(s) IV Push once  dextrose 50% Injectable 25 Gram(s) IV Push once  glucagon  Injectable 1 milliGRAM(s) IntraMuscular once  heparin   Injectable 5000 Unit(s) SubCutaneous every 8 hours  insulin glargine Injectable (LANTUS) 20 Unit(s) SubCutaneous at bedtime  insulin lispro (ADMELOG) corrective regimen sliding scale   SubCutaneous three times a day before meals  insulin lispro Injectable (ADMELOG) 5 Unit(s) SubCutaneous three times a day before meals  lactulose Syrup 15 Gram(s) Oral three times a day  levoFLOXacin IVPB 750 milliGRAM(s) IV Intermittent every 48 hours  midodrine 10 milliGRAM(s) Oral every 8 hours  pantoprazole  Injectable 40 milliGRAM(s) IV Push every 12 hours  propranolol 5 milliGRAM(s) Oral daily  rifAXIMin 550 milliGRAM(s) Oral two times a day    MEDICATIONS  (PRN):      New X-rays reviewed:                                                                                  ECHO    CXR interpreted by me:  B/l Infiltrates     no concerns

## 2022-03-24 NOTE — ASU DISCHARGE PLAN (ADULT/PEDIATRIC) - NS MD DC FALL RISK RISK
For information on Fall & Injury Prevention, visit: [email or web address] OR  [email or web address] OR  https://www.cdc.gov/steadi/patient.html

## 2022-03-24 NOTE — ASU PATIENT PROFILE, ADULT - FALL HARM RISK - HARM RISK INTERVENTIONS

## 2022-03-24 NOTE — BRIEF OPERATIVE NOTE - NSICDXBRIEFPREOP_GEN_ALL_CORE_FT
PRE-OP DIAGNOSIS:  Left ureteral stone 24-Mar-2022 10:06:00  Kevin Carvalho  Kidney stone on left side 24-Mar-2022 10:06:17  Kevin Carvalho

## 2022-03-24 NOTE — ASU DISCHARGE PLAN (ADULT/PEDIATRIC) - NURSING INSTRUCTIONS
Refer to the multicolored fact sheet for any problems you experience. If you have difficulty urinating or unable to urinate in 8 hours after surgery, call your Dr., or return to  emergency room.  Notify your Dr. if your fever is 101 or greater. If the pain medicine your  recbernabends does not help you, or you have severe pain call your Dr.. If you cannot reach the doctor, call [facility] Emergency Department at [phone] or go to your local Emergency Department. A responsible adult should be with you for the rest of the day and night for your safety, and to help you. Apply ice to affected area 20min on and 20min off for the  first 24-48 hours. Do not apply directly to skin, place barrier between ice and skin.  Resume your medications as listed on the attached Medication Record.

## 2022-03-24 NOTE — BRIEF OPERATIVE NOTE - NSICDXBRIEFPROCEDURE_GEN_ALL_CORE_FT
PROCEDURES:  Ureteroscopy, with laser lithotripsy and stent insertion 24-Mar-2022 10:05:44 Left Kevin Carvalho S

## 2022-03-24 NOTE — BRIEF OPERATIVE NOTE - NSICDXBRIEFPOSTOP_GEN_ALL_CORE_FT
POST-OP DIAGNOSIS:  Kidney stone on left side 24-Mar-2022 10:06:29  Kevin Carvalho  Left ureteral stone 24-Mar-2022 10:06:22  Kevin Carvalho

## 2022-03-24 NOTE — ASU DISCHARGE PLAN (ADULT/PEDIATRIC) - CARE PROVIDER_API CALL
Kevin Carvalho)  Urology  [address] [place] [address]  Phone: [phone]  Fax: [phone]  Scheduled Appointment: 03/30/2022 12:50 PM

## 2022-03-30 ENCOUNTER — APPOINTMENT (OUTPATIENT)
Dept: UROLOGY | Facility: CLINIC | Age: 47
End: 2022-03-30
Payer: COMMERCIAL

## 2022-03-30 ENCOUNTER — APPOINTMENT (OUTPATIENT)
Dept: UROLOGY | Facility: CLINIC | Age: 47
End: 2022-03-30

## 2022-03-30 ENCOUNTER — NON-APPOINTMENT (OUTPATIENT)
Age: 47
End: 2022-03-30

## 2022-03-30 VITALS
OXYGEN SATURATION: 96 % | BODY MASS INDEX: 39.96 KG/M2 | WEIGHT: 295 LBS | HEIGHT: 72 IN | DIASTOLIC BLOOD PRESSURE: 90 MMHG | HEART RATE: 82 BPM | SYSTOLIC BLOOD PRESSURE: 136 MMHG

## 2022-03-30 DIAGNOSIS — K21.9 GASTRO-ESOPHAGEAL REFLUX DISEASE WITHOUT ESOPHAGITIS: ICD-10-CM

## 2022-03-30 DIAGNOSIS — N20.2 CALCULUS OF KIDNEY WITH CALCULUS OF URETER: ICD-10-CM

## 2022-03-30 DIAGNOSIS — E55.9 VITAMIN D DEFICIENCY, UNSPECIFIED: ICD-10-CM

## 2022-03-30 DIAGNOSIS — Z85.828 PERSONAL HISTORY OF OTHER MALIGNANT NEOPLASM OF SKIN: ICD-10-CM

## 2022-03-30 DIAGNOSIS — G35 MULTIPLE SCLEROSIS: ICD-10-CM

## 2022-03-30 DIAGNOSIS — E66.9 OBESITY, UNSPECIFIED: ICD-10-CM

## 2022-03-30 DIAGNOSIS — Z46.6 ENCOUNTER FOR FITTING AND ADJUSTMENT OF URINARY DEVICE: ICD-10-CM

## 2022-03-30 DIAGNOSIS — I10 ESSENTIAL (PRIMARY) HYPERTENSION: ICD-10-CM

## 2022-03-30 PROCEDURE — 52310 CYSTOSCOPY AND TREATMENT: CPT

## 2022-03-30 PROCEDURE — 99214 OFFICE O/P EST MOD 30 MIN: CPT | Mod: 25

## 2022-03-30 NOTE — HISTORY OF PRESENT ILLNESS
[FreeTextEntry1] : 46 year old male presents for follow up. \par Felt weak, tired after Ureteroscopy. Also had Constipation.\par Had BM today, last 2 days feeling better. \par \par Status post Left ureteroscopy, laser lithotripsy, stone extraction and ureteral stent exchange on 3/24/2022 for left kidney and ureteral stone. \par \par Status post Cystoscopy and left ureteral stent placement on 3/5/2022 at [facility] for 5 mm obstructing left mid ureteral stone. \par  \par 1st episode of kidney stone. \par Mother has history of kidney stone.

## 2022-03-30 NOTE — HISTORY OF PRESENT ILLNESS
[FreeTextEntry1] : 46 year old male presents for follow up. \par Felt weak, tired after Ureteroscopy. Also had Constipation.\par Had BM today, last 2 days feeling better. \par \par Status post Left ureteroscopy, laser lithotripsy, stone extraction and ureteral stent exchange on 3/24/2022 for left kidney and ureteral stone. \par \par Status post Cystoscopy and left ureteral stent placement on 3/5/2022 at [facility] for 5 mm obstructing left mid ureteral stone. \par  \par 1st episode of kidney stone. \par Mother has history of kidney stone.

## 2022-03-30 NOTE — PHYSICAL EXAM
[General Appearance - In No Acute Distress] : no acute distress [FreeTextEntry1] : normal peripheral circulation  [] : no respiratory distress [Oriented To Time, Place, And Person] : oriented to person, place, and time [Normal Station and Gait] : the gait and station were normal for the patient's age

## 2022-03-30 NOTE — ASSESSMENT
[FreeTextEntry1] : Kidney stone:\par Ureteral stone:\par Encounter for stent removal:\par Performed cystoscopy and left ureteral stent removal today. \par \par Disorder of calcium metabolism:\par Discussed stone analysis of kidney and ureteral stone: \par Not clear from HIE which is which. \par \par 70% Calcium oxalate monohydrate\par 20% Calcium phosphate (apatite)\par 10% Calcium oxalate dihydrate\par \par 50% Calcium phosphate (apatite)\par 40% Calcium oxalate dihydrate\par 10% Calcium oxalate monohydrate \par \par Recommended Kidney stone prevention diet:\par Good oral hydration so that urine is clear to light yellow, usually 1.5 to 2 Liters of fluids, mainly water\par Increasing Citrate in diet by consuming citrus fruits and juices- clark, limes, oranges, grapefruits and berries \par Less red meat\par Less salt\par Limit foods with oxalate like- dark green vegetables, rhubarb, chocolate, wheat bran, nuts, cranberries, and beans\par \par Will get Kidney stone metabolic work up- Ca, PTH, Uric acid and 24 Hr urine kidney stone risk profile before follow up appointment. \par Will get Renal Ultrasound and KUB before follow up appointment. \par Return to office in 2 months or sooner if any issues.

## 2022-05-12 ENCOUNTER — APPOINTMENT (OUTPATIENT)
Dept: RADIOLOGY | Facility: CLINIC | Age: 47
End: 2022-05-12
Payer: COMMERCIAL

## 2022-05-12 ENCOUNTER — OUTPATIENT (OUTPATIENT)
Dept: OUTPATIENT SERVICES | Facility: HOSPITAL | Age: 47
LOS: 1 days | End: 2022-05-12
Payer: COMMERCIAL

## 2022-05-12 ENCOUNTER — APPOINTMENT (OUTPATIENT)
Dept: ULTRASOUND IMAGING | Facility: CLINIC | Age: 47
End: 2022-05-12
Payer: COMMERCIAL

## 2022-05-12 DIAGNOSIS — N20.1 CALCULUS OF URETER: ICD-10-CM

## 2022-05-12 DIAGNOSIS — Z98.890 OTHER SPECIFIED POSTPROCEDURAL STATES: Chronic | ICD-10-CM

## 2022-05-12 DIAGNOSIS — Z96.0 PRESENCE OF UROGENITAL IMPLANTS: Chronic | ICD-10-CM

## 2022-05-12 DIAGNOSIS — N20.0 CALCULUS OF KIDNEY: ICD-10-CM

## 2022-05-12 PROCEDURE — 76770 US EXAM ABDO BACK WALL COMP: CPT

## 2022-05-12 PROCEDURE — 76770 US EXAM ABDO BACK WALL COMP: CPT | Mod: 26

## 2022-05-12 PROCEDURE — 74018 RADEX ABDOMEN 1 VIEW: CPT | Mod: 26

## 2022-05-12 PROCEDURE — 74018 RADEX ABDOMEN 1 VIEW: CPT

## 2022-05-14 ENCOUNTER — LABORATORY RESULT (OUTPATIENT)
Age: 47
End: 2022-05-14

## 2022-05-22 ENCOUNTER — EMERGENCY (EMERGENCY)
Facility: HOSPITAL | Age: 47
LOS: 0 days | Discharge: HOME | End: 2022-05-22
Attending: EMERGENCY MEDICINE
Payer: COMMERCIAL

## 2022-05-22 VITALS
SYSTOLIC BLOOD PRESSURE: 145 MMHG | RESPIRATION RATE: 18 BRPM | HEART RATE: 73 BPM | TEMPERATURE: 98 F | OXYGEN SATURATION: 97 % | DIASTOLIC BLOOD PRESSURE: 100 MMHG

## 2022-05-22 VITALS
TEMPERATURE: 98 F | DIASTOLIC BLOOD PRESSURE: 101 MMHG | RESPIRATION RATE: 15 BRPM | SYSTOLIC BLOOD PRESSURE: 137 MMHG | OXYGEN SATURATION: 100 % | HEART RATE: 81 BPM

## 2022-05-22 DIAGNOSIS — K21.9 GASTRO-ESOPHAGEAL REFLUX DISEASE WITHOUT ESOPHAGITIS: ICD-10-CM

## 2022-05-22 DIAGNOSIS — G35 MULTIPLE SCLEROSIS: ICD-10-CM

## 2022-05-22 DIAGNOSIS — E66.9 OBESITY, UNSPECIFIED: ICD-10-CM

## 2022-05-22 DIAGNOSIS — R07.89 OTHER CHEST PAIN: ICD-10-CM

## 2022-05-22 DIAGNOSIS — Z96.0 PRESENCE OF UROGENITAL IMPLANTS: Chronic | ICD-10-CM

## 2022-05-22 DIAGNOSIS — Z98.890 OTHER SPECIFIED POSTPROCEDURAL STATES: Chronic | ICD-10-CM

## 2022-05-22 DIAGNOSIS — I10 ESSENTIAL (PRIMARY) HYPERTENSION: ICD-10-CM

## 2022-05-22 DIAGNOSIS — Z88.8 ALLERGY STATUS TO OTHER DRUGS, MEDICAMENTS AND BIOLOGICAL SUBSTANCES: ICD-10-CM

## 2022-05-22 LAB
ALBUMIN SERPL ELPH-MCNC: 4 G/DL — SIGNIFICANT CHANGE UP (ref 3.3–5)
ALP SERPL-CCNC: 62 U/L — SIGNIFICANT CHANGE UP (ref 40–120)
ALT FLD-CCNC: 47 U/L — SIGNIFICANT CHANGE UP (ref 12–78)
ANION GAP SERPL CALC-SCNC: 6 MMOL/L — SIGNIFICANT CHANGE UP (ref 5–17)
AST SERPL-CCNC: 30 U/L — SIGNIFICANT CHANGE UP (ref 15–37)
BASOPHILS # BLD AUTO: 0.06 K/UL — SIGNIFICANT CHANGE UP (ref 0–0.2)
BASOPHILS NFR BLD AUTO: 0.6 % — SIGNIFICANT CHANGE UP (ref 0–2)
BILIRUB SERPL-MCNC: 0.4 MG/DL — SIGNIFICANT CHANGE UP (ref 0.2–1.2)
BUN SERPL-MCNC: 16 MG/DL — SIGNIFICANT CHANGE UP (ref 7–23)
CALCIUM SERPL-MCNC: 9.6 MG/DL — SIGNIFICANT CHANGE UP (ref 8.5–10.1)
CHLORIDE SERPL-SCNC: 106 MMOL/L — SIGNIFICANT CHANGE UP (ref 96–108)
CO2 SERPL-SCNC: 26 MMOL/L — SIGNIFICANT CHANGE UP (ref 22–31)
CREAT SERPL-MCNC: 0.83 MG/DL — SIGNIFICANT CHANGE UP (ref 0.5–1.3)
D DIMER BLD IA.RAPID-MCNC: <150 NG/ML DDU — SIGNIFICANT CHANGE UP
EGFR: 108 ML/MIN/1.73M2 — SIGNIFICANT CHANGE UP
EOSINOPHIL # BLD AUTO: 0.23 K/UL — SIGNIFICANT CHANGE UP (ref 0–0.5)
EOSINOPHIL NFR BLD AUTO: 2.1 % — SIGNIFICANT CHANGE UP (ref 0–6)
GLUCOSE SERPL-MCNC: 97 MG/DL — SIGNIFICANT CHANGE UP (ref 70–99)
HCT VFR BLD CALC: 45.9 % — SIGNIFICANT CHANGE UP (ref 39–50)
HGB BLD-MCNC: 15.5 G/DL — SIGNIFICANT CHANGE UP (ref 13–17)
IMM GRANULOCYTES NFR BLD AUTO: 0.1 % — SIGNIFICANT CHANGE UP (ref 0–1.5)
LYMPHOCYTES # BLD AUTO: 3.65 K/UL — HIGH (ref 1–3.3)
LYMPHOCYTES # BLD AUTO: 34 % — SIGNIFICANT CHANGE UP (ref 13–44)
MCHC RBC-ENTMCNC: 27.8 PG — SIGNIFICANT CHANGE UP (ref 27–34)
MCHC RBC-ENTMCNC: 33.8 GM/DL — SIGNIFICANT CHANGE UP (ref 32–36)
MCV RBC AUTO: 82.4 FL — SIGNIFICANT CHANGE UP (ref 80–100)
MONOCYTES # BLD AUTO: 0.67 K/UL — SIGNIFICANT CHANGE UP (ref 0–0.9)
MONOCYTES NFR BLD AUTO: 6.2 % — SIGNIFICANT CHANGE UP (ref 2–14)
NEUTROPHILS # BLD AUTO: 6.12 K/UL — SIGNIFICANT CHANGE UP (ref 1.8–7.4)
NEUTROPHILS NFR BLD AUTO: 57 % — SIGNIFICANT CHANGE UP (ref 43–77)
PLATELET # BLD AUTO: 282 K/UL — SIGNIFICANT CHANGE UP (ref 150–400)
POTASSIUM SERPL-MCNC: 3.8 MMOL/L — SIGNIFICANT CHANGE UP (ref 3.5–5.3)
POTASSIUM SERPL-SCNC: 3.8 MMOL/L — SIGNIFICANT CHANGE UP (ref 3.5–5.3)
PROT SERPL-MCNC: 7.5 GM/DL — SIGNIFICANT CHANGE UP (ref 6–8.3)
RBC # BLD: 5.57 M/UL — SIGNIFICANT CHANGE UP (ref 4.2–5.8)
RBC # FLD: 13.7 % — SIGNIFICANT CHANGE UP (ref 10.3–14.5)
SODIUM SERPL-SCNC: 138 MMOL/L — SIGNIFICANT CHANGE UP (ref 135–145)
TROPONIN I, HIGH SENSITIVITY RESULT: 3.85 NG/L — SIGNIFICANT CHANGE UP
WBC # BLD: 10.74 K/UL — HIGH (ref 3.8–10.5)
WBC # FLD AUTO: 10.74 K/UL — HIGH (ref 3.8–10.5)

## 2022-05-22 PROCEDURE — 71046 X-RAY EXAM CHEST 2 VIEWS: CPT | Mod: 26

## 2022-05-22 PROCEDURE — 99285 EMERGENCY DEPT VISIT HI MDM: CPT

## 2022-05-22 PROCEDURE — 96374 THER/PROPH/DIAG INJ IV PUSH: CPT

## 2022-05-22 PROCEDURE — 93010 ELECTROCARDIOGRAM REPORT: CPT

## 2022-05-22 PROCEDURE — 80053 COMPREHEN METABOLIC PANEL: CPT

## 2022-05-22 PROCEDURE — 99285 EMERGENCY DEPT VISIT HI MDM: CPT | Mod: 25

## 2022-05-22 PROCEDURE — 93005 ELECTROCARDIOGRAM TRACING: CPT

## 2022-05-22 PROCEDURE — 36415 COLL VENOUS BLD VENIPUNCTURE: CPT

## 2022-05-22 PROCEDURE — 84484 ASSAY OF TROPONIN QUANT: CPT

## 2022-05-22 PROCEDURE — 85025 COMPLETE CBC W/AUTO DIFF WBC: CPT

## 2022-05-22 PROCEDURE — 71046 X-RAY EXAM CHEST 2 VIEWS: CPT

## 2022-05-22 PROCEDURE — 85379 FIBRIN DEGRADATION QUANT: CPT

## 2022-05-22 RX ORDER — KETOROLAC TROMETHAMINE 30 MG/ML
15 SYRINGE (ML) INJECTION ONCE
Refills: 0 | Status: DISCONTINUED | OUTPATIENT
Start: 2022-05-22 | End: 2022-05-22

## 2022-05-22 RX ADMIN — Medication 15 MILLIGRAM(S): at 12:47

## 2022-05-22 NOTE — ED STATDOCS - PROGRESS NOTE DETAILS
46 yo female with a PMH of MS presents with L sided chest pain since yesterday morning around 11am. 48 yo male with a PMH of MS presents with L sided chest pain since yesterday morning around 11am. States the pain is worse with movement (e.g. lifting his dad walker, or lifting his arms). Denies f/c/cough, fever. Will check labs, ekg, cxr, and reeval. -James Miguel PA-C Discussed results with pt. Likely msk in nature. Advised to take nsaids for pain and f/u with pmd. Pt without cardiologist. Will place referral for cardiologist fir further evaluation. -James Miguel PA-C

## 2022-05-22 NOTE — ED STATDOCS - CLINICAL SUMMARY MEDICAL DECISION MAKING FREE TEXT BOX
patient with chest pain worsening with certain movements, likely msk, however, history of obesity, will check cardiacs, and dimer. Toradol and reassess.

## 2022-05-22 NOTE — ED ADULT NURSE NOTE - OBJECTIVE STATEMENT
Pt reports lifting his father's walker to put it into a car and feeling pain to left side of chest. Pt reports cont pain today that seems positional, but is not acutely increased by movement. Pt denies f/c/c. Denies any SOB.

## 2022-05-22 NOTE — ED STATDOCS - NS ED ATTENDING STATEMENT MOD
This was a shared visit with the [facility]. I reviewed and verified the documentation and independently performed the documented:

## 2022-05-22 NOTE — ED STATDOCS - NSICDXPASTSURGICALHX_GEN_ALL_CORE_FT
PAST SURGICAL HISTORY:  S/P left knee arthroscopy 2007    S/P ureteral stent placement left 2022

## 2022-05-22 NOTE — ED STATDOCS - NSFOLLOWUPINSTRUCTIONS_ED_ALL_ED_FT
Nonspecific Chest Pain, Adult      Chest pain can be caused by many different conditions. It can be caused by a condition that is life-threatening and requires treatment right away. It can also be caused by something that is not life-threatening. If you have chest pain, it can be hard to know the difference, so it is important to get help right away to make sure that you do not have a serious condition.    Some life-threatening causes of chest pain include:  •Heart attack.      •A tear in the body's main blood vessel (aortic dissection).      •Inflammation around your heart (pericarditis).      •A problem in the lungs, such as a blood clot (pulmonary embolism) or a collapsed lung (pneumothorax).      Some non life-threatening causes of chest pain include:  •Heartburn.      •Anxiety or stress.      •Damage to the bones, muscles, and cartilage that make up your chest wall.      •Pneumonia or bronchitis.      •Shingles infection (varicella-zoster virus).      Chest pain can feel like:  •Pain or discomfort on the surface of your chest or deep in your chest.      •Crushing, pressure, aching, or squeezing pain.      •Burning or tingling.      •Dull or sharp pain that is worse when you move, cough, or take a deep breath.      •Pain or discomfort that is also felt in your back, neck, jaw, shoulder, or arm, or pain that spreads to any of these areas.      Your chest pain may come and go. It may also be constant. Your health care provider will do lab tests and other studies to find the cause of your pain. Treatment will depend on the cause of your chest pain.      Follow these instructions at home:    Medicines     •Take over-the-counter and prescription medicines only as told by your health care provider.      •If you were prescribed an antibiotic, take it as told by your health care provider. Do not stop taking the antibiotic even if you start to feel better.        Lifestyle      •Rest as directed by your health care provider.      • Do not use any products that contain nicotine or tobacco, such as cigarettes and e-cigarettes. If you need help quitting, ask your health care provider.      • Do not drink alcohol.    •Make healthy lifestyle choices as recommended. These may include:  •Getting regular exercise. Ask your health care provider to suggest some activities that are safe for you.      •Eating a heart-healthy diet. This includes plenty of fresh fruits and vegetables, whole grains, low-fat (lean) protein, and low-fat dairy products. A dietitian can help you find healthy eating options.      •Maintaining a healthy weight.      •Managing any other health conditions you have, such as high blood pressure (hypertension) or diabetes.      •Reducing stress, such as with yoga or relaxation techniques.        General instructions     •Pay attention to any changes in your symptoms. Tell your health care provider about them or any new symptoms.      •Avoid any activities that cause chest pain.      •Keep all follow-up visits as told by your health care provider. This is important. This includes visits for any further testing if your chest pain does not go away.        Contact a health care provider if:    •Your chest pain does not go away.      •You feel depressed.      •You have a fever.        Get help right away if:    •Your chest pain gets worse.      •You have a cough that gets worse, or you cough up blood.      •You have severe pain in your abdomen.      •You faint.      •You have sudden, unexplained chest discomfort.      •You have sudden, unexplained discomfort in your arms, back, neck, or jaw.      •You have shortness of breath at any time.      •You suddenly start to sweat, or your skin gets clammy.      •You feel nausea or you vomit.      •You suddenly feel lightheaded or dizzy.      •You have severe weakness, or unexplained weakness or fatigue.      •Your heart begins to beat quickly, or it feels like it is skipping beats.      These symptoms may represent a serious problem that is an emergency. Do not wait to see if the symptoms will go away. Get medical help right away. Call your local emergency services (911 in the [place] Do not drive yourself to the hospital.       Summary    •Chest pain can be caused by a condition that is serious and requires urgent treatment. It may also be caused by something that is not life-threatening.      •If you have chest pain, it is very important to see your health care provider. Your health care provider may do lab tests and other studies to find the cause of your pain.      •Follow your health care provider's instructions on taking medicines, making lifestyle changes, and getting emergency treatment if symptoms become worse.      •Keep all follow-up visits as told by your health care provider. This includes visits for any further testing if your chest pain does not go away.

## 2022-05-22 NOTE — ED STATDOCS - OBJECTIVE STATEMENT
48 y/o M with PMHx of MS, surgery of kidney stone x 2 months ago, presents to ED c/o chest pain.  Pt reports chest pain started yesterday at 11 am. Chest pain since yesterday with exacerbation. Pt feels more chest pain today. No history of blood clots in legs or chest. 48 y/o M with PMHx of MS, surgery of kidney stone x 2 months ago, presents to ED c/o chest pain.  Pt reports chest pain started yesterday at 11 am. Chest pain since yesterday which worsens with certain movements. Pt feels more chest pain today. No history of blood clots in legs or chest.

## 2022-05-22 NOTE — ED STATDOCS - NS ED ROS FT
Gen: No fever, normal appetite  Eyes: No eye irritation or discharge  ENT: No ear pain, congestion, sore throat  Resp: No cough or trouble breathing  Cardiovascular: + chest pain, no palpitation  Gastroenteric: No nausea/vomiting, diarrhea, constipation  :  No change in urine output; no dysuria  MS: No joint or muscle pain  Skin: No rashes  Neuro: No headache; no abnormal movements  Remainder negative, except as per the HPI

## 2022-05-22 NOTE — ED STATDOCS - PATIENT PORTAL LINK FT
You can access the FollowMyHealth Patient Portal offered by [facility] by registering at the following website: [email or web address]. By joining [facility]’s FollowMyHealth portal, you will also be able to view your health information using other applications (apps) compatible with our system. [phone]

## 2022-05-25 ENCOUNTER — APPOINTMENT (OUTPATIENT)
Dept: UROLOGY | Facility: CLINIC | Age: 47
End: 2022-05-25
Payer: COMMERCIAL

## 2022-05-25 LAB
STONE PLEASE NOTE:: NORMAL
STONE REVIEW: NORMAL
STONE, AMMONIUM 24 HR URINE: 39
STONE, AMMONIUM URINE: NORMAL UG/DL
STONE, BRUSHITE: 3.52 RATIO
STONE, CALCIUM 24 HR URINE: 347.5 MG/24 HR
STONE, CALCIUM OXALATE: 3.01 RATIO
STONE, CALCIUM URINE: 27.8 MG/DL
STONE, CHLORIDE 24 HR URINE: 61
STONE, CHLORIDE: 49 MMOL/L
STONE, CITRATE 24 HR URINE: 968 MG/24 HR
STONE, CITRATE URINE: 774 MG/L
STONE, CREATININE 24 HR URINE: 1915 MG/24 HR
STONE, CREATININE URINE: 153.2 MG/DL
STONE, CYSTINE 24 HR URINE: 29.51 MG/24 HR
STONE, CYSTINE URINE: 23.61 MG/L
STONE, MAGNESIUM 24 HR URINE: 79 MG/24 HR
STONE, MAGNESIUM URINE: 6.3 MG/DL
STONE, OSMOLALITY: 681
STONE, OXALATE 24 HR URINE: 8 MG/24 HR
STONE, OXALATE URINE: 6 MG/L
STONE, PH URINE: 6
STONE, PHOSPHOROUS URINE: 84.6 MG/DL
STONE, PHOSPHORUS 24 HR URINE: 1057.5 MG/24 HR
STONE, POTASSIUM 24 HR URINE: 51.9
STONE, POTASSIUM URINE: 41.5 MMOL/L
STONE, SODIUM 24 HR URINE: 100
STONE, SODIUM URATE: 4.61 RATIO
STONE, SODIUM URINE: 80 MMOL/L
STONE, STRUVITE: 0.03 RATIO
STONE, SULFATE 24 HR URINE: 39
STONE, SULFATE URINE: 31 MEQ/L
STONE, URIC ACID 24 HR URINE: 718 MG/24 HR
STONE, URIC ACID URINE: 2.34 RATIO
STONE, URIC ACID URINE: 57.4 MG/DL
TOT VOL (PRESERVATIVE): 1250 ML/24 HR
TOTAL VOLUME URINE: 1250 ML/24 HR

## 2022-05-25 PROCEDURE — 99214 OFFICE O/P EST MOD 30 MIN: CPT

## 2022-05-30 NOTE — ASSESSMENT
[FreeTextEntry1] : Renal mass:\par Kidney stone:\par Left renal mass and left kidney stone on Renal and Bladder Ultrasound. Not seen on KUB. \par Discussed kidney stone unlikely.\par Will get CT scan Abdomen and Pelvis with and without IV contrast. \par \par Disorder of calcium metabolism:\par Hypercalciuria:\par Discussed treatment options. Discussed use of Hydrochlorothiazide. Wants to hold off for now. \par Recommended to increase fluid intake to produce 2 to 2.5 liters of urine per day (approximately 3 liters intake), and should be primarily water.\par Will repeat 24 hour urine test in 3 months.  \par \par Return to office after CT scan.

## 2022-05-30 NOTE — HISTORY OF PRESENT ILLNESS
[FreeTextEntry1] : 47 year old male presents for follow up. \par Had chest pain last Saturday, went to [facility] ED. Negative work up. \par Still has off and on left back pain 1-2/10. \par Denies any difficulty with urination. \par Denies dysuria, hematuria, lower abdominal or flank pain, nausea, vomiting, fever, chills or rigors.\par \par Status post Left ureteroscopy, laser lithotripsy, stone extraction and ureteral stent exchange on 3/24/2022 for left kidney and ureteral stone. \par \par Stone analysis of kidney and ureteral stone: \par Not clear from [facility] which is which. \par \par 70% Calcium oxalate monohydrate\par 20% Calcium phosphate (apatite)\par 10% Calcium oxalate dihydrate\par \par 50% Calcium phosphate (apatite)\par 40% Calcium oxalate dihydrate\par 10% Calcium oxalate monohydrate \par \par Status post Cystoscopy and left ureteral stent placement on 3/5/2022 at [facility] for 5 mm obstructing left mid ureteral stone. \par  \par 1st episode of kidney stone. \par Mother has history of kidney stone.

## 2022-05-31 ENCOUNTER — NON-APPOINTMENT (OUTPATIENT)
Age: 47
End: 2022-05-31

## 2022-05-31 ENCOUNTER — APPOINTMENT (OUTPATIENT)
Dept: CARDIOLOGY | Facility: CLINIC | Age: 47
End: 2022-05-31
Payer: COMMERCIAL

## 2022-05-31 VITALS
HEIGHT: 72 IN | OXYGEN SATURATION: 97 % | SYSTOLIC BLOOD PRESSURE: 142 MMHG | WEIGHT: 297 LBS | HEART RATE: 88 BPM | BODY MASS INDEX: 40.23 KG/M2 | DIASTOLIC BLOOD PRESSURE: 100 MMHG

## 2022-05-31 DIAGNOSIS — R07.89 OTHER CHEST PAIN: ICD-10-CM

## 2022-05-31 DIAGNOSIS — I10 ESSENTIAL (PRIMARY) HYPERTENSION: ICD-10-CM

## 2022-05-31 PROCEDURE — 93000 ELECTROCARDIOGRAM COMPLETE: CPT

## 2022-05-31 PROCEDURE — 99204 OFFICE O/P NEW MOD 45 MIN: CPT

## 2022-05-31 RX ORDER — ESOMEPRAZOLE MAGNESIUM 40 MG/1
40 CAPSULE, DELAYED RELEASE ORAL DAILY
Refills: 0 | Status: ACTIVE | COMMUNITY

## 2022-05-31 RX ORDER — ADHESIVE TAPE 3"X 2.3 YD
50 MCG TAPE, NON-MEDICATED TOPICAL DAILY
Refills: 0 | Status: ACTIVE | COMMUNITY

## 2022-05-31 RX ORDER — LOSARTAN POTASSIUM AND HYDROCHLOROTHIAZIDE 25; 100 MG/1; MG/1
100-25 TABLET ORAL DAILY
Qty: 90 | Refills: 3 | Status: ACTIVE | COMMUNITY
Start: 1900-01-01 | End: 1900-01-01

## 2022-05-31 NOTE — HISTORY OF PRESENT ILLNESS
[FreeTextEntry1] : 47 y/\par \par referred from ED for chest pain.\par \par Went to ED 1 week ago\par Discomfort started saturday continuous \par then reported to ED sun.\par Persisted after visit & eventually subsided.\par Trops, ECG other workup neg in ED.\par \par Reviewed chest pain w/ pt.\par worse w/ bending down to reach something.\par Getting up out of bed.\par No relation walking.  No \par No dyspnea.\par Continuous sat to sun \par Also worsens w/ moving a certain way.\par No relation to palpation.  \par No relation inspiration, eating.\par No palpitations, no lightheadness, sycope.\par \par HTN x 10yrs , GERD, MS\par \par gliteramir acetate 3x a week '\par kidney stone surgery 2 months ago.\par Mom -  heart probs 81   stent Oct '21\par Dad - heart probs 5-6 stents 1st one 79\par \par never smoked, never ETOH, no illicits\par .\par on feet no regular exercise, >2 flights 4 city blocks.\par stairs does not bring on discomfort,\par \par PCP: Dr. Jennie bauer/ [facility]

## 2022-05-31 NOTE — ASSESSMENT
[FreeTextEntry1] : A/P:\par ----------------------\par *Chest pain\par -atypical\par \par -EST\par -Echo.\par ----------------------\par *HTN\par -increase losartan-HCTZ from 100 12.5 to 100 25\par -basic chem in 3 weeks prior to visit.\par ----------------------

## 2022-06-06 ENCOUNTER — NON-APPOINTMENT (OUTPATIENT)
Age: 47
End: 2022-06-06

## 2022-06-07 ENCOUNTER — OUTPATIENT (OUTPATIENT)
Dept: OUTPATIENT SERVICES | Facility: HOSPITAL | Age: 47
LOS: 1 days | End: 2022-06-07
Payer: COMMERCIAL

## 2022-06-07 ENCOUNTER — APPOINTMENT (OUTPATIENT)
Dept: CT IMAGING | Facility: CLINIC | Age: 47
End: 2022-06-07
Payer: COMMERCIAL

## 2022-06-07 DIAGNOSIS — N20.0 CALCULUS OF KIDNEY: ICD-10-CM

## 2022-06-07 DIAGNOSIS — Z96.0 PRESENCE OF UROGENITAL IMPLANTS: Chronic | ICD-10-CM

## 2022-06-07 DIAGNOSIS — N28.89 OTHER SPECIFIED DISORDERS OF KIDNEY AND URETER: ICD-10-CM

## 2022-06-07 DIAGNOSIS — Z98.890 OTHER SPECIFIED POSTPROCEDURAL STATES: Chronic | ICD-10-CM

## 2022-06-07 PROCEDURE — 74178 CT ABD&PLV WO CNTR FLWD CNTR: CPT

## 2022-06-07 PROCEDURE — 74178 CT ABD&PLV WO CNTR FLWD CNTR: CPT | Mod: 26

## 2022-06-07 PROCEDURE — 82565 ASSAY OF CREATININE: CPT

## 2022-06-24 ENCOUNTER — APPOINTMENT (OUTPATIENT)
Dept: UROLOGY | Facility: CLINIC | Age: 47
End: 2022-06-24
Payer: COMMERCIAL

## 2022-06-24 VITALS
OXYGEN SATURATION: 97 % | HEIGHT: 72 IN | HEART RATE: 67 BPM | WEIGHT: 295 LBS | SYSTOLIC BLOOD PRESSURE: 145 MMHG | BODY MASS INDEX: 39.96 KG/M2 | DIASTOLIC BLOOD PRESSURE: 89 MMHG

## 2022-06-24 DIAGNOSIS — R82.994 HYPERCALCIURIA: ICD-10-CM

## 2022-06-24 PROCEDURE — 99214 OFFICE O/P EST MOD 30 MIN: CPT

## 2022-06-27 PROBLEM — R82.994 HYPERCALCIURIA: Status: ACTIVE | Noted: 2022-05-30

## 2022-06-27 NOTE — HISTORY OF PRESENT ILLNESS
[FreeTextEntry1] : 47 year old male presents for follow up. \par No longer has chest pain. \par Still has off and on left flank pain.  \par Had CT scan. \par \par Seen on 5/25/2022. \par Went to [facility] ED for chest pain. \par Still had off and on left back pain 1-2/10. \par Denied any difficulty with urination. \par Denied dysuria, hematuria, nausea, vomiting, fever, chills or rigors. \par \par Felt weak, tired after Ureteroscopy. Also had Constipation.\par Had BM today, last 2 days feeling better. \par \par Status post Left ureteroscopy, laser lithotripsy, stone extraction and ureteral stent exchange on 3/24/2022 for left kidney and ureteral stone. \par \par Status post Cystoscopy and left ureteral stent placement on 3/5/2022 at [facility] for 5 mm obstructing left mid ureteral stone. \par  \par 1st episode of kidney stone. \par Mother has history of kidney stone.

## 2022-06-27 NOTE — PHYSICAL EXAM
[Normal Appearance] : normal appearance [General Appearance - In No Acute Distress] : no acute distress [] : no respiratory distress [Oriented To Time, Place, And Person] : oriented to person, place, and time [FreeTextEntry1] : normal peripheral circulation

## 2022-06-27 NOTE — ASSESSMENT
[FreeTextEntry1] : Disorder of calcium metabolism:\par Hypercalciuria:\par Recommended to reduce Salt intake. Less then 2400 mg per day. \par Calcium intake should be approximately 1000 mg per day. \par Increase fluid intake to produce 2 to 2.5 liters of urine per day (approximately 3 liters intake), and should be primarily water. \par 24 hour urine test before follow up appointment. \par \par Renal mass:\par Discussed CT scan. Discussed Renal mass possibly Milk of calcium. \par \par Kidney stone:\par Left lower pole non obstructing kidney stones. \par Discussed likely jyoti's plaque. \par Discussed treatment options. Will observe for now. \par \par Return to office in 3 months or sooner if any issues.

## 2022-06-28 ENCOUNTER — OUTPATIENT (OUTPATIENT)
Dept: OUTPATIENT SERVICES | Facility: HOSPITAL | Age: 47
LOS: 1 days | End: 2022-06-28
Payer: COMMERCIAL

## 2022-06-28 DIAGNOSIS — R07.89 OTHER CHEST PAIN: ICD-10-CM

## 2022-06-28 DIAGNOSIS — Z98.890 OTHER SPECIFIED POSTPROCEDURAL STATES: Chronic | ICD-10-CM

## 2022-06-28 DIAGNOSIS — I10 ESSENTIAL (PRIMARY) HYPERTENSION: ICD-10-CM

## 2022-06-28 DIAGNOSIS — Z96.0 PRESENCE OF UROGENITAL IMPLANTS: Chronic | ICD-10-CM

## 2022-06-28 PROCEDURE — 93306 TTE W/DOPPLER COMPLETE: CPT | Mod: 26

## 2022-06-28 PROCEDURE — 93306 TTE W/DOPPLER COMPLETE: CPT

## 2022-06-29 ENCOUNTER — APPOINTMENT (OUTPATIENT)
Dept: CARDIOLOGY | Facility: CLINIC | Age: 47
End: 2022-06-29

## 2022-06-29 DIAGNOSIS — R07.89 OTHER CHEST PAIN: ICD-10-CM

## 2022-06-29 DIAGNOSIS — I10 ESSENTIAL (PRIMARY) HYPERTENSION: ICD-10-CM

## 2022-06-29 PROCEDURE — 93015 CV STRESS TEST SUPVJ I&R: CPT

## 2022-07-29 ENCOUNTER — APPOINTMENT (OUTPATIENT)
Dept: CARDIOLOGY | Facility: CLINIC | Age: 47
End: 2022-07-29

## 2022-07-29 VITALS
DIASTOLIC BLOOD PRESSURE: 74 MMHG | SYSTOLIC BLOOD PRESSURE: 100 MMHG | HEIGHT: 72 IN | HEART RATE: 100 BPM | BODY MASS INDEX: 39.96 KG/M2 | WEIGHT: 295 LBS | OXYGEN SATURATION: 96 %

## 2022-07-29 PROCEDURE — 99214 OFFICE O/P EST MOD 30 MIN: CPT | Mod: 25

## 2022-07-29 PROCEDURE — 93000 ELECTROCARDIOGRAM COMPLETE: CPT

## 2022-08-01 NOTE — HISTORY OF PRESENT ILLNESS
[FreeTextEntry1] : 47 y/\par \par referred from ED for chest pain.\par \par Reviewed EST - no ischemic changes.\par Reviewed Echo - normal.\par Reviewed symptoms - occurs only w/ specific arm movements\par not w/ walking up stairs or other activities.

## 2022-08-01 NOTE — ASSESSMENT
[FreeTextEntry1] : \par A/P:\par \par *chest pain\par -noncardiac-does not occur w/ exertion\par only w/ specific upper body movements\par -Echo & EST neg.\par \par -no further evaluation\par \par *HTN\par -BP now controlled w/ uptitration of hctz\par -basic chem for tommorrow pt will call\par on mon to review results.\par ==================\par \par 8/1/'22\par left message to call me.\par When he calls will discuss normal K & Cr.\par May followup w/ PCP for further BP mx.

## 2022-08-31 LAB
ANION GAP SERPL CALC-SCNC: 14 MMOL/L
BUN SERPL-MCNC: 15 MG/DL
CALCIUM SERPL-MCNC: 9.4 MG/DL
CHLORIDE SERPL-SCNC: 101 MMOL/L
CO2 SERPL-SCNC: 27 MMOL/L
CREAT SERPL-MCNC: 0.82 MG/DL
EGFR: 108 ML/MIN/1.73M2
GLUCOSE SERPL-MCNC: 107 MG/DL
POTASSIUM SERPL-SCNC: 4.2 MMOL/L
SODIUM SERPL-SCNC: 142 MMOL/L

## 2022-09-23 ENCOUNTER — APPOINTMENT (OUTPATIENT)
Dept: UROLOGY | Facility: CLINIC | Age: 47
End: 2022-09-23

## 2022-09-23 VITALS
WEIGHT: 295 LBS | DIASTOLIC BLOOD PRESSURE: 87 MMHG | OXYGEN SATURATION: 96 % | HEIGHT: 72 IN | HEART RATE: 86 BPM | BODY MASS INDEX: 39.96 KG/M2 | SYSTOLIC BLOOD PRESSURE: 121 MMHG

## 2022-09-23 DIAGNOSIS — N20.0 CALCULUS OF KIDNEY: ICD-10-CM

## 2022-09-23 DIAGNOSIS — N28.89 OTHER SPECIFIED DISORDERS OF KIDNEY AND URETER: ICD-10-CM

## 2022-09-23 DIAGNOSIS — M54.9 DORSALGIA, UNSPECIFIED: ICD-10-CM

## 2022-09-23 PROCEDURE — 99214 OFFICE O/P EST MOD 30 MIN: CPT

## 2022-09-29 PROBLEM — N20.0 KIDNEY STONE ON LEFT SIDE: Status: ACTIVE | Noted: 2022-03-30

## 2022-09-29 PROBLEM — M54.9 LEFT-SIDED BACK PAIN: Status: ACTIVE | Noted: 2022-09-29

## 2022-09-29 NOTE — ASSESSMENT
[FreeTextEntry1] : Reviewed outside records on Patient's phone via portal including CT scan images. \par \par Kidney stone:\par Again discussed left kidney stone possibly jyoti's plaque. \par Discussed treatment options. Will continue to observe. \par \par Left back pain:\par Discussed possibility that he may have passed a small kidney stone. \par \par Renal mass:\par Renal mass possibly Milk of calcium. \par Will get MRI before follow up appointment. \par \par Return to office in 3 months or sooner if any issues.

## 2022-09-29 NOTE — LETTER BODY
[Dear  ___] : Dear  [unfilled], [Courtesy Letter:] : I had the pleasure of seeing your patient, [unfilled], in my office today. [Please see my note below.] : Please see my note below. [Sincerely,] : Sincerely, [FreeTextEntry3] : Kevin Carvalho MD\par  of Urology\par [facility]\par \par [facility]\par Offices:\par [address] [place], [facility]\par [address] [address]\par [address] [facility], [address]\par \par TEL: [phone]\par FAX: [phone]

## 2022-09-29 NOTE — HISTORY OF PRESENT ILLNESS
[FreeTextEntry1] : 47 year old male presents for follow up. \par 2 weeks ago had severe left back pain, saw PCP: microscopic blood. \par Had CT scan: Left kidney stone. \par Better now. \par \par Seen on 6/24/22. \par CT scan. Renal mass possibly Milk of calcium. \par Left lower pole non obstructing kidney stones. Discussed likely jyoti's plaque. \par \par Seen on 5/25/2022. \par Went to [facility] ED for chest pain. \par Still had off and on left back pain 1-2/10. \par Denied any difficulty with urination. \par Denied dysuria, hematuria, nausea, vomiting, fever, chills or rigors. \par \par Felt weak, tired after Ureteroscopy. Also had Constipation.\par Had BM today, last 2 days feeling better. \par \par Status post Left ureteroscopy, laser lithotripsy, stone extraction and ureteral stent exchange on 3/24/2022 for left kidney and ureteral stone. \par \par Status post Cystoscopy and left ureteral stent placement on 3/5/2022 at [facility] for 5 mm obstructing left mid ureteral stone. \par  \par 1st episode of kidney stone. \par Mother has history of kidney stone.

## 2022-10-12 ENCOUNTER — NON-APPOINTMENT (OUTPATIENT)
Age: 47
End: 2022-10-12

## 2022-10-12 LAB
STONE PLEASE NOTE:: NORMAL
STONE REVIEW: NORMAL
STONE, AMMONIUM 24 HR URINE: 41
STONE, AMMONIUM URINE: NORMAL UG/DL
STONE, BRUSHITE: 1.19 RATIO
STONE, CALCIUM 24 HR URINE: 361.2 MG/24 HR
STONE, CALCIUM OXALATE: 3.31 RATIO
STONE, CALCIUM URINE: 12.9 MG/DL
STONE, CHLORIDE 24 HR URINE: 188
STONE, CHLORIDE: 67 MMOL/L
STONE, CITRATE 24 HR URINE: 1179 MG/24 HR
STONE, CITRATE URINE: 421 MG/L
STONE, CREATININE 24 HR URINE: 1915.2 MG/24 HR
STONE, CREATININE URINE: 68.4 MG/DL
STONE, CYSTINE 24 HR URINE: 18.9 MG/24 HR
STONE, CYSTINE URINE: 6.75 MG/L
STONE, MAGNESIUM 24 HR URINE: 123 MG/24 HR
STONE, MAGNESIUM URINE: 4.4 MG/DL
STONE, OSMOLALITY: 395
STONE, OXALATE 24 HR URINE: 25 MG/24 HR
STONE, OXALATE URINE: 9 MG/L
STONE, PH URINE: 6.1
STONE, PHOSPHOROUS URINE: 35 MG/DL
STONE, PHOSPHORUS 24 HR URINE: 980 MG/24 HR
STONE, POTASSIUM 24 HR URINE: 80.4
STONE, POTASSIUM URINE: 28.7 MMOL/L
STONE, SODIUM 24 HR URINE: 210
STONE, SODIUM URATE: 2.63 RATIO
STONE, SODIUM URINE: 75 MMOL/L
STONE, STRUVITE: 0.01 RATIO
STONE, SULFATE 24 HR URINE: 31
STONE, SULFATE URINE: 11 MEQ/L
STONE, URIC ACID 24 HR URINE: 913 MG/24 HR
STONE, URIC ACID URINE: 1.1 RATIO
STONE, URIC ACID URINE: 32.6 MG/DL
TOT VOL (PRESERVATIVE): 2800 ML/24 HR
TOTAL VOLUME URINE: 2800 ML/24 HR

## 2022-12-09 ENCOUNTER — APPOINTMENT (OUTPATIENT)
Dept: UROLOGY | Facility: CLINIC | Age: 47
End: 2022-12-09

## 2022-12-28 ENCOUNTER — NON-APPOINTMENT (OUTPATIENT)
Age: 47
End: 2022-12-28

## 2023-01-03 ENCOUNTER — NON-APPOINTMENT (OUTPATIENT)
Age: 48
End: 2023-01-03

## 2023-01-06 ENCOUNTER — NON-APPOINTMENT (OUTPATIENT)
Age: 48
End: 2023-01-06

## 2023-01-06 ENCOUNTER — APPOINTMENT (OUTPATIENT)
Dept: NEUROLOGY | Facility: CLINIC | Age: 48
End: 2023-01-06
Payer: COMMERCIAL

## 2023-01-06 VITALS
DIASTOLIC BLOOD PRESSURE: 86 MMHG | BODY MASS INDEX: 39.96 KG/M2 | TEMPERATURE: 97.8 F | HEART RATE: 85 BPM | HEIGHT: 72 IN | SYSTOLIC BLOOD PRESSURE: 121 MMHG | WEIGHT: 295 LBS

## 2023-01-06 PROCEDURE — 99204 OFFICE O/P NEW MOD 45 MIN: CPT

## 2023-01-06 NOTE — DISCUSSION/SUMMARY
[FreeTextEntry1] : 47-year-old man with a history of multiple sclerosis,Diagnosed 8 years ago, has been symptoms free now for at least 7 years.  Examination nonfocal.\par Has been doing well and tolerating well glatiramer  acetate injections 3 times per week no recent reevaluation of his underlying condition.\par Plan: We will order a follow-up MRI brain with and without contrast, follow-up cervical and thoracic spine with and without contrast.\par Prescription for glatiramer acetate 40 mg subcu q. 3 times a week sent to his pharmacy, 1 year supply.\par Return to office, 1 year.  Advised patient to contact this office if anything changes clinically.

## 2023-01-06 NOTE — HISTORY OF PRESENT ILLNESS
[FreeTextEntry1] : 47-year-old man history of hypertension, kidney stones, diagnosed with MS now 8 years ago, when he presented with optic neuritis, subsequently developed transient numbness weakness on the left side, evaluated then, diagnosed by a neurologist recently retired, and has been now for many years now on [place] injection 3 times a week.\par Since then no further events, denies any increasing difficulty performing his daily activities.  He is a  by trade, has found no difficulty performing his job.  No difficulty walking.\par He complains of occasional back pain, sometimes numbness in the right leg, more in the thigh.  Worse with prolonged standing.  Modifying or changing post positioning of sitting or walking seems to make the problem go away.  No weakness of the leg, no change in bowel bladder habits.\par Patient reports was evaluated had a spinal tap, MRIs of brain cervical thoracic spine, eventually diagnosed multiple sclerosis.\par Initially treated with steroids but has not been on any steroids now for least 5 years.\par Recently this year last year dealt with kidney stones, treated with lithotripsy in occasional flank pain, followed by his urologist.\par Denies worsening vision, double vision, trouble speaking, no episodes of transient lateral weakness or numbness of the face, arms or extremities.  No change in bowel bladder habits.  Denies constipation.\par No episode of passing out or becoming aware of surroundings.  No history of connective tissue disorders, no rheumatological disorders, no history of stroke.\par For review the patient brings several notes from his previous neurologist, dated 2016 and 2018 follow-up notes.  Patient stable.  He brings MRIs of the cervical, brain MRIs with and without contrast, 2016 and 2018 the last 1.

## 2023-01-06 NOTE — PHYSICAL EXAM
[General Appearance - Alert] : alert [General Appearance - In No Acute Distress] : in no acute distress [Oriented To Time, Place, And Person] : oriented to person, place, and time [Impaired Insight] : insight and judgment were intact [Affect] : the affect was normal [Person] : oriented to person [Place] : oriented to place [Time] : oriented to time [Concentration Intact] : normal concentrating ability [Visual Intact] : visual attention was ~T not ~L decreased [Naming Objects] : no difficulty naming common objects [Repeating Phrases] : no difficulty repeating a phrase [Writing A Sentence] : no difficulty writing a sentence [Fluency] : fluency intact [Comprehension] : comprehension intact [Reading] : reading intact [Past History] : adequate knowledge of personal past history [Cranial Nerves Optic (II)] : visual acuity intact bilaterally,  visual fields full to confrontation, pupils equal round and reactive to light [Cranial Nerves Oculomotor (III)] : extraocular motion intact [Cranial Nerves Trigeminal (V)] : facial sensation intact symmetrically [Cranial Nerves Facial (VII)] : face symmetrical [Cranial Nerves Vestibulocochlear (VIII)] : hearing was intact bilaterally [Cranial Nerves Glossopharyngeal (IX)] : tongue and palate midline [Cranial Nerves Accessory (XI - Cranial And Spinal)] : head turning and shoulder shrug symmetric [Cranial Nerves Hypoglossal (XII)] : there was no tongue deviation with protrusion [Motor Tone] : muscle tone was normal in all four extremities [Motor Strength] : muscle strength was normal in all four extremities [No Muscle Atrophy] : normal bulk in all four extremities [Paresis Pronator Drift Right-Sided] : no pronator drift on the right [Paresis Pronator Drift Left-Sided] : no pronator drift on the left [Sensation Tactile Decrease] : light touch was intact [Sensation Pain / Temperature Decrease] : pain and temperature was intact [Romberg's Sign] : Romberg's sign was negtive [Balance] : balance was intact [Past-pointing] : there was no past-pointing [Tremor] : no tremor present [Dysdiadochokinesia Bilaterally] : not present [Coordination - Dysmetria Impaired Finger-to-Nose Bilateral] : not present [Coordination - Dysmetria Impaired Heel-to-Shin Bilateral] : not present [1+] : Ankle jerk left 1+ [Plantar Reflex Right Only] : normal on the right [Plantar Reflex Left Only] : normal on the left [Outer Ear] : the ears and nose were normal in appearance [Hearing Threshold Finger Rub Not [place]] : hearing was normal [Neck Appearance] : the appearance of the neck was normal [Neck Cervical Mass (___cm)] : no neck mass was observed [Respiration, Rhythm And Depth] : normal respiratory rhythm and effort [Auscultation Breath Sounds / Voice Sounds] : lungs were clear to auscultation bilaterally [Heart Rate And Rhythm] : heart rate was normal and rhythm regular [Heart Sounds Gallop] : no gallops [Murmurs] : no murmurs [Arterial Pulses Carotid] : carotid pulses were normal with no bruits [Full Pulse] : the pedal pulses are present [Edema] : there was no peripheral edema [No Spinal Tenderness] : no spinal tenderness [Abnormal Walk] : normal gait [Nail Clubbing] : no clubbing  or cyanosis of the fingernails [Musculoskeletal - Swelling] : no joint swelling seen [Skin Turgor] : normal skin turgor [] : no rash

## 2023-01-09 ENCOUNTER — APPOINTMENT (OUTPATIENT)
Dept: MRI IMAGING | Facility: CLINIC | Age: 48
End: 2023-01-09

## 2023-01-17 ENCOUNTER — NON-APPOINTMENT (OUTPATIENT)
Age: 48
End: 2023-01-17

## 2023-03-20 ENCOUNTER — OUTPATIENT (OUTPATIENT)
Dept: OUTPATIENT SERVICES | Facility: HOSPITAL | Age: 48
LOS: 1 days | End: 2023-03-20

## 2023-03-20 DIAGNOSIS — Z96.0 PRESENCE OF UROGENITAL IMPLANTS: Chronic | ICD-10-CM

## 2023-03-20 DIAGNOSIS — Z98.890 OTHER SPECIFIED POSTPROCEDURAL STATES: Chronic | ICD-10-CM

## 2023-07-12 ENCOUNTER — APPOINTMENT (OUTPATIENT)
Dept: MRI IMAGING | Facility: CLINIC | Age: 48
End: 2023-07-12
Payer: COMMERCIAL

## 2023-07-12 ENCOUNTER — OUTPATIENT (OUTPATIENT)
Dept: OUTPATIENT SERVICES | Facility: HOSPITAL | Age: 48
LOS: 1 days | End: 2023-07-12
Payer: COMMERCIAL

## 2023-07-12 DIAGNOSIS — G35 MULTIPLE SCLEROSIS: ICD-10-CM

## 2023-07-12 DIAGNOSIS — Z98.890 OTHER SPECIFIED POSTPROCEDURAL STATES: Chronic | ICD-10-CM

## 2023-07-12 DIAGNOSIS — Z96.0 PRESENCE OF UROGENITAL IMPLANTS: Chronic | ICD-10-CM

## 2023-07-12 PROCEDURE — 70553 MRI BRAIN STEM W/O & W/DYE: CPT | Mod: 26

## 2023-07-12 PROCEDURE — 72156 MRI NECK SPINE W/O & W/DYE: CPT | Mod: 26

## 2023-07-12 PROCEDURE — 72157 MRI CHEST SPINE W/O & W/DYE: CPT

## 2023-07-12 PROCEDURE — A9585: CPT

## 2023-07-12 PROCEDURE — 70553 MRI BRAIN STEM W/O & W/DYE: CPT

## 2023-07-12 PROCEDURE — 72157 MRI CHEST SPINE W/O & W/DYE: CPT | Mod: 26

## 2023-07-12 PROCEDURE — 72156 MRI NECK SPINE W/O & W/DYE: CPT

## 2023-08-01 ENCOUNTER — APPOINTMENT (OUTPATIENT)
Dept: NEUROLOGY | Facility: CLINIC | Age: 48
End: 2023-08-01
Payer: COMMERCIAL

## 2023-08-01 DIAGNOSIS — M54.41 LUMBAGO WITH SCIATICA, RIGHT SIDE: ICD-10-CM

## 2023-08-01 DIAGNOSIS — G89.29 LUMBAGO WITH SCIATICA, RIGHT SIDE: ICD-10-CM

## 2023-08-01 PROCEDURE — 99214 OFFICE O/P EST MOD 30 MIN: CPT

## 2023-08-01 NOTE — DATA REVIEWED
[de-identified] : MRI of brain with and without contrast performed July 12, 2023 read as impression: Subtle T2 prolongation in the periatrial white matter region.

## 2023-08-01 NOTE — HISTORY OF PRESENT ILLNESS
[FreeTextEntry1] : 48-year-old man right-handed history of MS in the past diagnosed 8 years ago followed by another physician who retired.  Presented then with transient loss of vision, numbness and tingling of the extremities more on the left than the right.  No further episodes, earlier this year will complain of occasional numbness feelings of the legs, low back pain with radiation to the right thigh.  Worse with prolonged standing, sitting. Patient underwent recent MRIs of the brain, cervical thoracic spine demonstrated minimal findings, at C2 a small syrinx. No complaints of bowel bladder disturbance, no incontinence.  No weakness. Occasional disc describes mild headaches more as a pressure heaviness that radiates from the back of the head, may last an hour or 2, on occasion will take a Tylenol.  No association with loss of vision double vision, trouble speaking unilateral weakness or numbness.

## 2023-08-01 NOTE — PHYSICAL EXAM
[General Appearance - Alert] : alert [General Appearance - In No Acute Distress] : in no acute distress [Person] : oriented to person [Place] : oriented to place [Time] : oriented to time [Cranial Nerves Optic (II)] : visual acuity intact bilaterally,  visual fields full to confrontation, pupils equal round and reactive to light [Cranial Nerves Oculomotor (III)] : extraocular motion intact [Cranial Nerves Trigeminal (V)] : facial sensation intact symmetrically [Cranial Nerves Facial (VII)] : face symmetrical [Cranial Nerves Vestibulocochlear (VIII)] : hearing was intact bilaterally [Cranial Nerves Accessory (XI - Cranial And Spinal)] : head turning and shoulder shrug symmetric [Cranial Nerves Hypoglossal (XII)] : there was no tongue deviation with protrusion [Motor Tone] : muscle tone was normal in all four extremities [Motor Strength] : muscle strength was normal in all four extremities [No Muscle Atrophy] : normal bulk in all four extremities [Motor Handedness Right-Handed] : the patient is right hand dominant [Sensation Tactile Decrease] : light touch was intact [Sensation Pain / Temperature Decrease] : pain and temperature was intact [Romberg's Sign] : Romberg's sign was negtive [Abnormal Walk] : normal gait [Balance] : balance was intact [Past-pointing] : there was no past-pointing [Tremor] : no tremor present [Dysdiadochokinesia Bilaterally] : not present [Coordination - Dysmetria Impaired Finger-to-Nose Bilateral] : not present [1+] : Ankle jerk left 1+

## 2023-08-01 NOTE — REVIEW OF SYSTEMS
[As Noted in HPI] : as noted in HPI [Numbness] : numbness [Tingling] : tingling [Negative] : Heme/Lymph [FreeTextEntry9] : Chronic back pain

## 2023-08-01 NOTE — DISCUSSION/SUMMARY
[FreeTextEntry1] : 48-year-old man right-handed with a history of MS, complaining of chronic low back pain occasionally right-sided sciatica, paresthesias of the lower extremities. Recent imaging of the cervical, thoracic, brain MRIs, which showed a subtle periatrial white matter hyperintense area which is does not enhance, a small syrinx at C2.  No other central neurosystem findings, raising the possibility this is not MS. We will refer patient to MS specialist for second opinion We will order an MRI of the lumbar spine for evaluation of his chronic back pain. EMG and nerve conduction of the lower extremities. For now we will continue elastomeric acetate. Return to office, 4 to 6 months.

## 2023-08-15 NOTE — ASU PATIENT PROFILE, ADULT - PATIENT'S PREFERRED PRONOUN
Daniela from [facility]  Home will be re-faxing the death certificate for Dr. Morejon to sign.  Writer gave Daniela correct fax number.    Caller is aware provider is out of the office this week.   Him/He

## 2023-08-28 ENCOUNTER — APPOINTMENT (OUTPATIENT)
Dept: MRI IMAGING | Facility: CLINIC | Age: 48
End: 2023-08-28

## 2023-09-01 ENCOUNTER — APPOINTMENT (OUTPATIENT)
Dept: NEUROLOGY | Facility: CLINIC | Age: 48
End: 2023-09-01
Payer: COMMERCIAL

## 2023-09-01 VITALS
HEIGHT: 72 IN | SYSTOLIC BLOOD PRESSURE: 120 MMHG | WEIGHT: 306 LBS | BODY MASS INDEX: 41.45 KG/M2 | HEART RATE: 87 BPM | DIASTOLIC BLOOD PRESSURE: 85 MMHG

## 2023-09-01 PROCEDURE — 99417 PROLNG OP E/M EACH 15 MIN: CPT

## 2023-09-01 PROCEDURE — 99215 OFFICE O/P EST HI 40 MIN: CPT

## 2023-09-01 PROCEDURE — G2212 PROLONG OUTPT/OFFICE VIS: CPT

## 2023-09-01 NOTE — DATA REVIEWED
[de-identified] : MRI brain w/w/o 7/12/2023- minimal WM lesions, punctuate lesion at R corona radiate and periatrial lesions (R>L, lesion on R perpendicular to lat ventricle). No enhancing lesions. Stable compared to a brain MRI from 2016 on my review. MRI C spine 7/12/2023- stable compared to 2016 and 2014 MRI on my review. C2 synrinx. Stable C2-C3 central cord lesion w/o enhancement.  MRI T spine 7/17/2023 w/w/o- no definite cord lesion on my review. T8-9 disc protrusion with mild central canal stenosis.

## 2023-09-01 NOTE — PHYSICAL EXAM
[FreeTextEntry1] : PHYSICAL EXAM Constitutional: Alert, no acute distress  Psychiatric: appropriate affect and mood Pulmonary: No respiratory distress, stable on room air  NEUROLOGICAL EXAM Mental status: The patient is alert, attentive and conversational memory intact. Speech/language: No dysarthria Cranial nerves: CN II: Visual fields are full to confrontation. Pupil size equal and briskly reactive to light. Mild RAPD on the left. VA 20/20 OU. CN III, IV, VI: EOMI, no nystagmus, no ptosis CN V: Facial sensation is intact to pinprick in all 3 divisions bilaterally. CN VII: Face is symmetric with normal eye closure and smile. CN VII: Hearing is normal to rubbing fingers CN IX, X: Palate elevates symmetrically.  CN XI: Head turning and shoulder shrug are intact CN XII: Tongue is midline with normal movements and no atrophy. Motor: Strength is full bilaterally. 5/5 muscle power in bilateral UE and LE. Reflexes: R        L  Biceps    2+       2+  Patellar   2+       2+  Achilles  2+       2+ Plantar responses- R down, L down Sensory:  Sensations intact to LT and PP UE and LE Coordination/Cerebellar: There is no dysmetria on finger-to-nose and heel to shin.  Gait/Stance: Posture is normal. Gait is steady with normal steps, base, arm swing, and turning. Tandem gait is normal. Romberg is negative.

## 2023-09-01 NOTE — HISTORY OF PRESENT ILLNESS
[FreeTextEntry1] : HPI (initial visit Sep 01, 2023)- MARLYS PARK is a 48 year old man w/ hx of HTN, Kidney stones, referred by Dr Zachariah Phillips for second opinion on diagnosis of MS.   Pt was reportedly diagnosed with MS in 2014. He developed L transient vision loss (optic neuritis) in one eye and was trated with steroids. Subsequently, a few months later (within the year), developed numbness and weakness of b/l UE and LE (L>R, arms and and then legs), again treated with steroids with symptom improvement. He had MRI imaging which showed cervical myelitis and a few nonspecific WM lesions on brain MRI. He reportedly also had a spinal tap (results not available). He was followed by Dr Marino Collier. He was started on Copaxone and has been stable clinically and radiologically.  His neurologist retired and he established care with Dr Kevin Phillips 1/2023. Repeat MRI imaging ordered 7/2023. I have personally reviewed images- MRI brain w/w/o 7/12/2023- minimal WM lesions, punctuate lesion at R corona radiate and periatrial lesions (R>L, lesion on R perpendicular to lat ventricle). No enhancing lesions. Stable compared to a brain MRI from 2016 on my review. MRI C spine 7/12/2023- stable compared to 2016 and 2014 MRI on my review. C2 synrinx. Stable C2-C3 central cord lesion w/o enhancement.  MRI T spine 7/17/2023 w/w/o- no definite cord lesion on my review. T8-9 disc protrusion with mild central canal stenosis.   The only bothersome symptoms is calf cramping at night. A few months ago, developed paresthesia's around his month and distal extremities, intially constant for a few weeks and now intermittent and can be position related. Symptoms now less frequent.  EMG/NCS scheduled 10/26.

## 2023-09-01 NOTE — ASSESSMENT
[FreeTextEntry1] : Assessment/Plan:  48-year-old male w/ hx of multiple sclerosis vs clinically isolated syndrome, diagnosed 2014 (L ON 2014, cervical myelitis 2014/2015, +DIS and not DIT, unknown OCB status), on Copaxone since 2014/2015 with clinical and radiological stability.   Of note, patient has been experiencing intermittent paresthesia's over face and distal extremities x few months, now less frequent. Unclear etiology of paresthesia's, given stable MRI scans, less likely a MS relapse. I agree with obtaining EMG/NCS to rule out peripheral neuropathy.   Plan:  1. Diagnostic Plan/Imaging: Will plan on repeating MRI brain and C spine w/o contrast 7/2024 for radiological stability (annual scan). Will order MS mimicker labs (including NMO and MOG ab)  2. Disease Modifying therapy plan: Continue Copaxone 40 mg 3 times weekly. Side effects reviewed. Copaxone: () 40 mg subcutaneous 3x/weekly () Localized lipoatrophy may occur. Rotate injection sites to minimize this risk () Discussed possibility of immediate post-injection reaction (flushing, chest pain, palpitations, anxiety, dyspnea). This is transient and usually self-limiting. This is NOT an allergic reaction and there is no need to stop Copaxone if this happens.   3. Symptomatic therapy plan: () Vitamin D3 and B12 supplementations  () Distal extremity paresthesia's: - EMG/NCS scheduled 10/26/23. Pt deferred on neuropathic pain meds at this time.  () I also stressed on the importance of healthy eating habits, routine physical therapy and vitamin D supplementation.    Return to clinic 6 months  The above plan was discussed with MARLYS PARK in great detail.  MARLYS PARK verbalized understanding and agrees with plan as detailed above. Patient was provided education and counselling on current diagnosis/symptoms. He was advised to call our clinic at [phone] for any new or worsening symptoms, or with any questions or concerns. In case of acute onset of neurological symptoms or worsening presentation, patient was advised to present to nearest emergency room for further evaluation. MARLYS PARK expressed understanding and all his questions/concerns were addressed.  Tiki Alcantara M.D

## 2023-09-11 LAB
25(OH)D3 SERPL-MCNC: 22.9 NG/ML
ACE BLD-CCNC: 66 U/L
B BURGDOR AB SER-IMP: NEGATIVE
B BURGDOR IGG+IGM SER QL: 0.04 INDEX
CRP SERPL-MCNC: 11 MG/L
ENA RNP AB SER IA-ACNC: 0.4 AL
ENA SM AB SER IA-ACNC: <0.2 AL
ENA SS-A AB SER IA-ACNC: 0.2 AL
ENA SS-B AB SER IA-ACNC: <0.2 AL
ERYTHROCYTE [SEDIMENTATION RATE] IN BLOOD BY WESTERGREN METHOD: 22 MM/HR
FOLATE SERPL-MCNC: 9 NG/ML
HCYS SERPL-MCNC: 6.7 UMOL/L
RHEUMATOID FACT SER QL: <10 IU/ML
T PALLIDUM AB SER QL IA: NEGATIVE
VIT B12 SERPL-MCNC: 430 PG/ML

## 2023-09-13 LAB
ANA SER IF-ACNC: NEGATIVE
ANCA AB SER-IMP: NEGATIVE
C-ANCA SER-ACNC: NEGATIVE
DSDNA AB SER-ACNC: 12 IU/ML
IGG4 SER-MCNC: 3 MG/DL
P-ANCA TITR SER IF: NEGATIVE

## 2023-09-14 LAB — METHYLMALONATE SERPL-SCNC: 118 NMOL/L

## 2023-09-15 LAB
COPPER SERPL-MCNC: 107 UG/DL
HTLV I+II AB SER QL: NORMAL
MOG AB SER QL CBA IFA: NEGATIVE
ZINC SERPL-MCNC: 74 UG/DL

## 2023-09-15 RX ORDER — CHOLECALCIFEROL (VITAMIN D3) 1250 MCG
1.25 MG CAPSULE ORAL
Qty: 12 | Refills: 0 | Status: ACTIVE | COMMUNITY
Start: 2023-09-15 | End: 1900-01-01

## 2023-10-05 ENCOUNTER — OUTPATIENT (OUTPATIENT)
Dept: OUTPATIENT SERVICES | Facility: HOSPITAL | Age: 48
LOS: 1 days | End: 2023-10-05
Payer: COMMERCIAL

## 2023-10-05 ENCOUNTER — APPOINTMENT (OUTPATIENT)
Dept: MRI IMAGING | Facility: CLINIC | Age: 48
End: 2023-10-05
Payer: COMMERCIAL

## 2023-10-05 DIAGNOSIS — Z98.890 OTHER SPECIFIED POSTPROCEDURAL STATES: Chronic | ICD-10-CM

## 2023-10-05 DIAGNOSIS — M54.41 LUMBAGO WITH SCIATICA, RIGHT SIDE: ICD-10-CM

## 2023-10-05 DIAGNOSIS — Z96.0 PRESENCE OF UROGENITAL IMPLANTS: Chronic | ICD-10-CM

## 2023-10-05 PROCEDURE — 72148 MRI LUMBAR SPINE W/O DYE: CPT

## 2023-10-05 PROCEDURE — 72148 MRI LUMBAR SPINE W/O DYE: CPT | Mod: 26

## 2023-10-09 NOTE — BRIEF OPERATIVE NOTE - NSICDXBRIEFPROCEDURE_GEN_ALL_CORE_FT
PROCEDURES:  Cystoscopy with insertion of ureteral stent in adult 05-Mar-2022 14:51:47 Left Kevin Carvalho  
normal

## 2023-10-24 NOTE — ED ADULT NURSE NOTE - BRAND OF COVID-19 VACCINATION
Please inform patient of results.     1. Hold Coumadin x1 day, then restart Coumadin.  Repeat INR in 1 week    Other labwork within acceptable ranges.  Pfizer dose 1 and 2

## 2023-10-26 ENCOUNTER — APPOINTMENT (OUTPATIENT)
Dept: NEUROLOGY | Facility: CLINIC | Age: 48
End: 2023-10-26
Payer: COMMERCIAL

## 2023-10-26 PROCEDURE — 95886 MUSC TEST DONE W/N TEST COMP: CPT

## 2023-10-26 PROCEDURE — 95910 NRV CNDJ TEST 7-8 STUDIES: CPT

## 2023-10-26 PROCEDURE — 99213 OFFICE O/P EST LOW 20 MIN: CPT

## 2023-11-08 NOTE — H&P PST ADULT - FALL HARM RISK - FALLEN IN PAST
PATIENT CAME IN AND WAS SEEN 9/26/2023 AND WAS TOLD HE WOULD BE HAVING SURGERY AND HAS STILL NOT HEARD ANYTHING. PATIENT REQUESTING A CALL TO LET HIM KNOW THE STATUS. PLEASE ADVISE   No

## 2024-03-01 ENCOUNTER — APPOINTMENT (OUTPATIENT)
Dept: NEUROLOGY | Facility: CLINIC | Age: 49
End: 2024-03-01

## 2024-03-20 ENCOUNTER — APPOINTMENT (OUTPATIENT)
Age: 49
End: 2024-03-20

## 2024-04-16 ENCOUNTER — EMERGENCY (EMERGENCY)
Facility: HOSPITAL | Age: 49
LOS: 0 days | Discharge: HOME | End: 2024-04-16
Attending: STUDENT IN AN ORGANIZED HEALTH CARE EDUCATION/TRAINING PROGRAM
Payer: COMMERCIAL

## 2024-04-16 VITALS
TEMPERATURE: 98 F | SYSTOLIC BLOOD PRESSURE: 131 MMHG | RESPIRATION RATE: 18 BRPM | HEART RATE: 97 BPM | OXYGEN SATURATION: 96 % | DIASTOLIC BLOOD PRESSURE: 86 MMHG

## 2024-04-16 VITALS — WEIGHT: 309.97 LBS

## 2024-04-16 DIAGNOSIS — R31.9 HEMATURIA, UNSPECIFIED: ICD-10-CM

## 2024-04-16 DIAGNOSIS — Z88.8 ALLERGY STATUS TO OTHER DRUGS, MEDICAMENTS AND BIOLOGICAL SUBSTANCES: ICD-10-CM

## 2024-04-16 DIAGNOSIS — Z98.890 OTHER SPECIFIED POSTPROCEDURAL STATES: Chronic | ICD-10-CM

## 2024-04-16 DIAGNOSIS — G35 MULTIPLE SCLEROSIS: ICD-10-CM

## 2024-04-16 DIAGNOSIS — Z96.0 PRESENCE OF UROGENITAL IMPLANTS: Chronic | ICD-10-CM

## 2024-04-16 DIAGNOSIS — N13.2 HYDRONEPHROSIS WITH RENAL AND URETERAL CALCULOUS OBSTRUCTION: ICD-10-CM

## 2024-04-16 DIAGNOSIS — R10.9 UNSPECIFIED ABDOMINAL PAIN: ICD-10-CM

## 2024-04-16 DIAGNOSIS — K21.9 GASTRO-ESOPHAGEAL REFLUX DISEASE WITHOUT ESOPHAGITIS: ICD-10-CM

## 2024-04-16 DIAGNOSIS — R11.0 NAUSEA: ICD-10-CM

## 2024-04-16 DIAGNOSIS — D72.829 ELEVATED WHITE BLOOD CELL COUNT, UNSPECIFIED: ICD-10-CM

## 2024-04-16 DIAGNOSIS — I10 ESSENTIAL (PRIMARY) HYPERTENSION: ICD-10-CM

## 2024-04-16 LAB
ALBUMIN SERPL ELPH-MCNC: 4.1 G/DL — SIGNIFICANT CHANGE UP (ref 3.3–5)
ALP SERPL-CCNC: 62 U/L — SIGNIFICANT CHANGE UP (ref 40–120)
ALT FLD-CCNC: 96 U/L — HIGH (ref 12–78)
ANION GAP SERPL CALC-SCNC: 7 MMOL/L — SIGNIFICANT CHANGE UP (ref 5–17)
APPEARANCE UR: CLEAR — SIGNIFICANT CHANGE UP
AST SERPL-CCNC: 42 U/L — HIGH (ref 15–37)
BACTERIA # UR AUTO: NEGATIVE /HPF — SIGNIFICANT CHANGE UP
BASOPHILS # BLD AUTO: 0.08 K/UL — SIGNIFICANT CHANGE UP (ref 0–0.2)
BASOPHILS NFR BLD AUTO: 0.6 % — SIGNIFICANT CHANGE UP (ref 0–2)
BILIRUB SERPL-MCNC: 0.4 MG/DL — SIGNIFICANT CHANGE UP (ref 0.2–1.2)
BILIRUB UR-MCNC: NEGATIVE — SIGNIFICANT CHANGE UP
BUN SERPL-MCNC: 11 MG/DL — SIGNIFICANT CHANGE UP (ref 7–23)
CALCIUM SERPL-MCNC: 9.8 MG/DL — SIGNIFICANT CHANGE UP (ref 8.5–10.1)
CAST: 0 /LPF — SIGNIFICANT CHANGE UP (ref 0–4)
CHLORIDE SERPL-SCNC: 105 MMOL/L — SIGNIFICANT CHANGE UP (ref 96–108)
CO2 SERPL-SCNC: 26 MMOL/L — SIGNIFICANT CHANGE UP (ref 22–31)
COLOR SPEC: ABNORMAL
CREAT SERPL-MCNC: 0.88 MG/DL — SIGNIFICANT CHANGE UP (ref 0.5–1.3)
DIFF PNL FLD: ABNORMAL
EGFR: 105 ML/MIN/1.73M2 — SIGNIFICANT CHANGE UP
EOSINOPHIL # BLD AUTO: 0.2 K/UL — SIGNIFICANT CHANGE UP (ref 0–0.5)
EOSINOPHIL NFR BLD AUTO: 1.4 % — SIGNIFICANT CHANGE UP (ref 0–6)
GLUCOSE SERPL-MCNC: 122 MG/DL — HIGH (ref 70–99)
GLUCOSE UR QL: NEGATIVE MG/DL — SIGNIFICANT CHANGE UP
HCT VFR BLD CALC: 45.3 % — SIGNIFICANT CHANGE UP (ref 39–50)
HGB BLD-MCNC: 15.3 G/DL — SIGNIFICANT CHANGE UP (ref 13–17)
IMM GRANULOCYTES NFR BLD AUTO: 0.4 % — SIGNIFICANT CHANGE UP (ref 0–0.9)
KETONES UR-MCNC: NEGATIVE MG/DL — SIGNIFICANT CHANGE UP
LEUKOCYTE ESTERASE UR-ACNC: ABNORMAL
LIDOCAIN IGE QN: 58 U/L — SIGNIFICANT CHANGE UP (ref 13–75)
LYMPHOCYTES # BLD AUTO: 24.2 % — SIGNIFICANT CHANGE UP (ref 13–44)
LYMPHOCYTES # BLD AUTO: 3.46 K/UL — HIGH (ref 1–3.3)
MCHC RBC-ENTMCNC: 27.7 PG — SIGNIFICANT CHANGE UP (ref 27–34)
MCHC RBC-ENTMCNC: 33.8 GM/DL — SIGNIFICANT CHANGE UP (ref 32–36)
MCV RBC AUTO: 81.9 FL — SIGNIFICANT CHANGE UP (ref 80–100)
MONOCYTES # BLD AUTO: 0.7 K/UL — SIGNIFICANT CHANGE UP (ref 0–0.9)
MONOCYTES NFR BLD AUTO: 4.9 % — SIGNIFICANT CHANGE UP (ref 2–14)
NEUTROPHILS # BLD AUTO: 9.78 K/UL — HIGH (ref 1.8–7.4)
NEUTROPHILS NFR BLD AUTO: 68.5 % — SIGNIFICANT CHANGE UP (ref 43–77)
NITRITE UR-MCNC: NEGATIVE — SIGNIFICANT CHANGE UP
PH UR: 5.5 — SIGNIFICANT CHANGE UP (ref 5–8)
PLATELET # BLD AUTO: 346 K/UL — SIGNIFICANT CHANGE UP (ref 150–400)
POTASSIUM SERPL-MCNC: 4.1 MMOL/L — SIGNIFICANT CHANGE UP (ref 3.5–5.3)
POTASSIUM SERPL-SCNC: 4.1 MMOL/L — SIGNIFICANT CHANGE UP (ref 3.5–5.3)
PROT SERPL-MCNC: 7.6 GM/DL — SIGNIFICANT CHANGE UP (ref 6–8.3)
PROT UR-MCNC: 30 MG/DL
RBC # BLD: 5.53 M/UL — SIGNIFICANT CHANGE UP (ref 4.2–5.8)
RBC # FLD: 14.2 % — SIGNIFICANT CHANGE UP (ref 10.3–14.5)
RBC CASTS # UR COMP ASSIST: 1043 /HPF — HIGH (ref 0–4)
SODIUM SERPL-SCNC: 138 MMOL/L — SIGNIFICANT CHANGE UP (ref 135–145)
SP GR SPEC: 1.01 — SIGNIFICANT CHANGE UP (ref 1–1.03)
SQUAMOUS # UR AUTO: 0 /HPF — SIGNIFICANT CHANGE UP (ref 0–5)
UROBILINOGEN FLD QL: 1 MG/DL — SIGNIFICANT CHANGE UP (ref 0.2–1)
WBC # BLD: 14.28 K/UL — HIGH (ref 3.8–10.5)
WBC # FLD AUTO: 14.28 K/UL — HIGH (ref 3.8–10.5)
WBC UR QL: 5 /HPF — SIGNIFICANT CHANGE UP (ref 0–5)

## 2024-04-16 PROCEDURE — 99284 EMERGENCY DEPT VISIT MOD MDM: CPT | Mod: 25

## 2024-04-16 PROCEDURE — 99285 EMERGENCY DEPT VISIT HI MDM: CPT

## 2024-04-16 PROCEDURE — 81001 URINALYSIS AUTO W/SCOPE: CPT

## 2024-04-16 PROCEDURE — 80053 COMPREHEN METABOLIC PANEL: CPT

## 2024-04-16 PROCEDURE — 85025 COMPLETE CBC W/AUTO DIFF WBC: CPT

## 2024-04-16 PROCEDURE — 74177 CT ABD & PELVIS W/CONTRAST: CPT | Mod: 26,MC

## 2024-04-16 PROCEDURE — 36415 COLL VENOUS BLD VENIPUNCTURE: CPT

## 2024-04-16 PROCEDURE — 74177 CT ABD & PELVIS W/CONTRAST: CPT | Mod: MC

## 2024-04-16 PROCEDURE — 87086 URINE CULTURE/COLONY COUNT: CPT

## 2024-04-16 PROCEDURE — 83690 ASSAY OF LIPASE: CPT

## 2024-04-16 RX ORDER — OXYCODONE HYDROCHLORIDE 5 MG/1
1 TABLET ORAL
Qty: 12 | Refills: 0
Start: 2024-04-16 | End: 2024-04-18

## 2024-04-16 RX ORDER — TAMSULOSIN HYDROCHLORIDE 0.4 MG/1
1 CAPSULE ORAL
Qty: 15 | Refills: 0
Start: 2024-04-16 | End: 2024-04-30

## 2024-04-16 RX ORDER — SODIUM CHLORIDE 9 MG/ML
1000 INJECTION INTRAMUSCULAR; INTRAVENOUS; SUBCUTANEOUS ONCE
Refills: 0 | Status: COMPLETED | OUTPATIENT
Start: 2024-04-16 | End: 2024-04-16

## 2024-04-16 RX ORDER — ONDANSETRON 8 MG/1
1 TABLET, FILM COATED ORAL
Qty: 15 | Refills: 0
Start: 2024-04-16 | End: 2024-04-22

## 2024-04-16 RX ADMIN — SODIUM CHLORIDE 1000 MILLILITER(S): 9 INJECTION INTRAMUSCULAR; INTRAVENOUS; SUBCUTANEOUS at 16:25

## 2024-04-16 NOTE — ED STATDOCS - OBJECTIVE STATEMENT
50 yo male w/PMHx HTN on Losartan, MS, kidney stones, and GERD presents to the ED c/o hematuria and L sided flank pain. No recent injury/trauma/heavy lifting. Felt mildly nauseous earlier today.

## 2024-04-16 NOTE — ED STATDOCS - CARE PROVIDER_API CALL
Kevin Carvalho [facility]  Urology  [address], [address]  [place] [address]  Phone: [phone]  Fax: [phone]  Established Patient  Follow Up Time:

## 2024-04-16 NOTE — ED ADULT TRIAGE NOTE - CHIEF COMPLAINT QUOTE
pt presenting w/reports of blood in urine and flank pain. States he has a HX of kidney stones, urinary frequency

## 2024-04-16 NOTE — ED STATDOCS - PATIENT PORTAL LINK FT
You can access the FollowMyHealth Patient Portal offered by [facility] by registering at the following website: [email or web address]. By joining [employer]’s FollowMyHealth portal, you will also be able to view your health information using other applications (apps) compatible with our system.

## 2024-04-16 NOTE — ED STATDOCS - CLINICAL SUMMARY MEDICAL DECISION MAKING FREE TEXT BOX
49-year-old male with prior history of kidney stone presents for hematuria with mild back pain.  Differential includes: Kidney stone, UTI, less likely MSK.  Will evaluate with blood work, UA, CT, symptomatic treatment, reassess.

## 2024-04-16 NOTE — ED STATDOCS - PROGRESS NOTE DETAILS
48 y/o Male with PMHx of Kidney stones, Hypertension multiple sclerosis and GERD presented to the ED with complaint of left side flank pain with hematuria patient was mildly nauseous will follow-up labs and CT imaging. reevaluate after pain medication.   Jing Chatterjee PA-C On reeval patient reports no need for pain medication or anti nausea medication at this time. WBC elevated to 14, hemoglobin hematocrit normal. renal function not elevated, BUN 11 creatinine 0.88. urinalysis shows large blood more than 1000 RBCs negative nitrite, negative WBC, negative bacteria. CT scan shows 4 mm proximal left ureteral calculus with mild left hydronephrosis. patient informed of findings. as patient is comfortable in appearance at this time will discharge home with pain medication Zofran and Flomax to follow-up with Dr. Carvalho as an outpatient return precautions given.

## 2024-04-16 NOTE — ED STATDOCS - CARE PLAN
Principal Discharge DX:	Left ureteral stone  Secondary Diagnosis:	Hydronephrosis, left  Secondary Diagnosis:	Renal cyst, left   1

## 2024-04-16 NOTE — ED ADULT NURSE NOTE - NSICDXPASTMEDICALHX_GEN_ALL_CORE_FT
No PAST MEDICAL HISTORY:  GERD (gastroesophageal reflux disease)     HTN (hypertension)     Left ureteral stone     Multiple sclerosis     Obesity

## 2024-04-16 NOTE — ED ADULT NURSE NOTE - OBJECTIVE STATEMENT
pt presented to er c/o left sided flank pain. pt endorsing nausea and hematuria. reports he had a fever tmax 100 a few days ago. denies chest pain, shortness of breath.

## 2024-04-17 LAB
CULTURE RESULTS: SIGNIFICANT CHANGE UP
SPECIMEN SOURCE: SIGNIFICANT CHANGE UP

## 2024-04-18 ENCOUNTER — APPOINTMENT (OUTPATIENT)
Dept: UROLOGY | Facility: CLINIC | Age: 49
End: 2024-04-18
Payer: COMMERCIAL

## 2024-04-18 DIAGNOSIS — E83.50 UNSPECIFIED DISORDER OF CALCIUM METABOLISM: ICD-10-CM

## 2024-04-18 DIAGNOSIS — N28.1 CYST OF KIDNEY, ACQUIRED: ICD-10-CM

## 2024-04-18 PROCEDURE — 99214 OFFICE O/P EST MOD 30 MIN: CPT

## 2024-04-18 RX ORDER — TAMSULOSIN HYDROCHLORIDE 0.4 MG/1
0.4 CAPSULE ORAL
Qty: 30 | Refills: 0 | Status: ACTIVE | COMMUNITY
Start: 2024-04-18 | End: 1900-01-01

## 2024-04-25 PROBLEM — E83.50 DISORDER OF CALCIUM METABOLISM: Status: ACTIVE | Noted: 2022-03-30

## 2024-04-25 PROBLEM — N28.1 RENAL CYST: Status: ACTIVE | Noted: 2024-04-25

## 2024-04-25 NOTE — LETTER BODY
[Dear  ___] : Dear  [unfilled], [Courtesy Letter:] : I had the pleasure of seeing your patient, [unfilled], in my office today. [Please see my note below.] : Please see my note below. [Sincerely,] : Sincerely, [FreeTextEntry3] : Kevin Carvalho MD\par   of Urology\par  [facility] [employer] [facility]\par  \par  [facility]\par  Offices:\par  [address] [facility]\par  [address] [address]\par  [address] [facility], [address]\par  \par  TEL: [phone]\par  FAX: [phone]

## 2024-04-25 NOTE — HISTORY OF PRESENT ILLNESS
[FreeTextEntry1] : 04/18/2024:  49-year-old male presents for ureteral stone.   Patient presented to [facility] ED of 04/16/2024 with complaints of gross hematuria and slight left flank pain.   On CT scan, was found to have a 4 mm left proximal ureteral stone.  Patient was sent home on Flomax, pain medication, and nausea medication.   Has not needed pain medication. Is taking Flomax.  Gross hematuria has resolved. Does not have ant flank pain at this moment.   No bother with urination.   Before this episode, patient did have fever, chills, and diarrhea. Had COVID testing and Flu testing. Tests were negative.    MRI Abdomen with and without IV contrast (1/4/2023): 2.8 cm proteinaceous/hemorrhagic cyst left kidney posteriorly.    CT scan Abdomen and Pelvis without IV contrast (9/16/2022): Five 1 mm adjacent non-obstructing calculi in the lower pole left kidney.  Indeterminate 2.7 cm hyperdense lesion mid pole left kidney.    Status post Left ureteroscopy, laser lithotripsy, stone extraction and ureteral stent exchange on 3/24/2022 for left kidney and ureteral stone.  Stone analysis: 70% Calcium oxalate monohydrate 20% Calcium phosphate (apatite) 10% Calcium oxalate dihydrate  Status post Cystoscopy and left ureteral stent placement on 3/5/2022 at [facility] for 5 mm obstructing left mid ureteral stone.    1st episode of kidney stone.  Mother has history of kidney stone.

## 2024-04-25 NOTE — ADDENDUM
[FreeTextEntry1] :  Iqra AVELAR assisted in documentation on 04/18/2024  acting as a scribe for Dr. Kevin Carvalho.

## 2024-04-25 NOTE — ASSESSMENT
[FreeTextEntry1] : Reviewed records. Discussed labs and imaging.  CT scan Abdomen and Pelvis with IV contrast: 4 mm proximal left ureteral calculus. Mild left hydronephrosis.  Additional bilateral nonobstructive intrarenal calculi measuring up to 2 mm.  Redemonstrated 2.7 cm left renal mass, previously characterized as probable hemorrhagic cyst.  Ureteral stone: Discussed treatment options. Will continue medical therapy.   Will get repeat renal bladder ultrasound and KUB before follow-up appointment.  Recommended to go to emergency department if condition worsens.    Renal cyst: Discussed that Renal cysts are a common finding on routine radiological studies. Autopsy studies in patients over the age of 50 reveal greater than a 50% chance of having at least one simple renal cyst. And that renal cysts may be classified as simple or complex depending how they look on imaging. Discussed complexity of renal cysts and its implications as regards malignancy.  Stable renal cyst.   Disorder of calcium metabolism: Reiterated kidney stone prevention diet.  Will do metabolic workup for kidney stone at a later date.   Return to office in three weeks or sooner if any issues.

## 2024-05-06 ENCOUNTER — APPOINTMENT (OUTPATIENT)
Dept: ULTRASOUND IMAGING | Facility: CLINIC | Age: 49
End: 2024-05-06
Payer: COMMERCIAL

## 2024-05-06 ENCOUNTER — APPOINTMENT (OUTPATIENT)
Dept: RADIOLOGY | Facility: CLINIC | Age: 49
End: 2024-05-06

## 2024-05-06 ENCOUNTER — OUTPATIENT (OUTPATIENT)
Dept: OUTPATIENT SERVICES | Facility: HOSPITAL | Age: 49
LOS: 1 days | End: 2024-05-06
Payer: COMMERCIAL

## 2024-05-06 DIAGNOSIS — N20.1 CALCULUS OF URETER: ICD-10-CM

## 2024-05-06 DIAGNOSIS — Z98.890 OTHER SPECIFIED POSTPROCEDURAL STATES: Chronic | ICD-10-CM

## 2024-05-06 DIAGNOSIS — Z96.0 PRESENCE OF UROGENITAL IMPLANTS: Chronic | ICD-10-CM

## 2024-05-06 PROCEDURE — 76770 US EXAM ABDO BACK WALL COMP: CPT | Mod: 26

## 2024-05-06 PROCEDURE — 74018 RADEX ABDOMEN 1 VIEW: CPT | Mod: 26

## 2024-05-06 PROCEDURE — 74018 RADEX ABDOMEN 1 VIEW: CPT

## 2024-05-06 PROCEDURE — 76770 US EXAM ABDO BACK WALL COMP: CPT

## 2024-05-09 ENCOUNTER — APPOINTMENT (OUTPATIENT)
Dept: UROLOGY | Facility: CLINIC | Age: 49
End: 2024-05-09
Payer: COMMERCIAL

## 2024-05-09 PROCEDURE — 99214 OFFICE O/P EST MOD 30 MIN: CPT

## 2024-05-19 NOTE — HISTORY OF PRESENT ILLNESS
[FreeTextEntry1] : 05/09/2024: 49-year-old male presents for follow-up.   Taking Flomax. Had two episodes of gross hematuria and on-and-off left-sided back pain since last visit.  Did not see stone pass. Did do renal bladder ultrasound and KUB.   Seen on 04/18/2024 for ureteral stone.   Went to [facility] ED of 04/16/2024 with complaints of gross hematuria and slight left flank pain.   On CT scan, was found to have a 4 mm left proximal ureteral stone.  Patient was sent home on Flomax, pain medication, and nausea medication.   Had not needed pain medication. Is taking Flomax.  Gross hematuria had resolved. Did not have ant flank pain at this moment.   No bother with urination.   Before this episode, patient did have fever, chills, and diarrhea. Had COVID testing and Flu testing. Tests were negative.    CT scan Abdomen and Pelvis with IV contrast: 4 mm proximal left ureteral calculus. Mild left hydronephrosis. Additional bilateral nonobstructive intrarenal calculi measuring up to 2 mm. Redemonstrated 2.7 cm left renal mass, previously characterized as probable hemorrhagic cyst.  MRI Abdomen with and without IV contrast (1/4/2023): 2.8 cm proteinaceous/hemorrhagic cyst left kidney posteriorly.    CT scan Abdomen and Pelvis without IV contrast (9/16/2022): Five 1 mm adjacent non-obstructing calculi in the lower pole left kidney.  Indeterminate 2.7 cm hyperdense lesion mid pole left kidney.    Status post Left ureteroscopy, laser lithotripsy, stone extraction and ureteral stent exchange on 3/24/2022 for left kidney and ureteral stone.  Stone analysis: 70% Calcium oxalate monohydrate 20% Calcium phosphate (apatite) 10% Calcium oxalate dihydrate  Status post Cystoscopy and left ureteral stent placement on 3/5/2022 at [facility] for 5 mm obstructing left mid ureteral stone.    1st episode of kidney stone.  Mother has history of kidney stone.

## 2024-05-19 NOTE — LETTER BODY
[Dear  ___] : Dear  [unfilled], [Courtesy Letter:] : I had the pleasure of seeing your patient, [unfilled], in my office today. [Please see my note below.] : Please see my note below. [Sincerely,] : Sincerely, [FreeTextEntry3] : Kevin Carvalho MD\par   of Urology\par  [facility]\par  \par  [facility]\par  Offices:\par  [address] [facility]\par  [address] [address]\par  [address] [facility], [address]\par  \par  TEL: [phone]\par  FAX: [phone]

## 2024-05-19 NOTE — ASSESSMENT
[FreeTextEntry1] : Reviewed records. Discussed labs and imaging.   Ureteral stone: On review of the KUB, it looks like the stone is a L4 location. At the time of CT scan, was at opposite L3.   On ultrasound, left fullness was appreciated. Official report was not available. Discussed treatment options. Will schedule ureteroscopy for 06/03/2024. Will continue medical therapy until then.  Will get repeat renal bladder ultrasound and KUB 1 week before.  Discussed may get CT scan on the day of the surgery if there is any concern. Will continue Flomax and medical therapy for now.  Patient will call us if he passes the stone.

## 2024-05-19 NOTE — ADDENDUM
[FreeTextEntry1] :  Iqra AVELAR assisted in documentation on 05/09/2024  acting as a scribe for Dr. Kevin Carvalho.

## 2024-05-24 ENCOUNTER — OUTPATIENT (OUTPATIENT)
Dept: OUTPATIENT SERVICES | Facility: HOSPITAL | Age: 49
LOS: 1 days | End: 2024-05-24
Payer: COMMERCIAL

## 2024-05-24 ENCOUNTER — RESULT REVIEW (OUTPATIENT)
Age: 49
End: 2024-05-24

## 2024-05-24 VITALS
HEIGHT: 71 IN | HEART RATE: 102 BPM | RESPIRATION RATE: 16 BRPM | TEMPERATURE: 98 F | SYSTOLIC BLOOD PRESSURE: 119 MMHG | OXYGEN SATURATION: 97 % | WEIGHT: 311.07 LBS | DIASTOLIC BLOOD PRESSURE: 79 MMHG

## 2024-05-24 DIAGNOSIS — Z01.818 ENCOUNTER FOR OTHER PREPROCEDURAL EXAMINATION: ICD-10-CM

## 2024-05-24 DIAGNOSIS — Z98.890 OTHER SPECIFIED POSTPROCEDURAL STATES: Chronic | ICD-10-CM

## 2024-05-24 DIAGNOSIS — Z96.0 PRESENCE OF UROGENITAL IMPLANTS: Chronic | ICD-10-CM

## 2024-05-24 LAB
ANION GAP SERPL CALC-SCNC: 7 MMOL/L — SIGNIFICANT CHANGE UP (ref 5–17)
APPEARANCE UR: CLEAR — SIGNIFICANT CHANGE UP
APTT BLD: 33.8 SEC — SIGNIFICANT CHANGE UP (ref 24.5–35.6)
BACTERIA # UR AUTO: NEGATIVE /HPF — SIGNIFICANT CHANGE UP
BASOPHILS # BLD AUTO: 0.07 K/UL — SIGNIFICANT CHANGE UP (ref 0–0.2)
BASOPHILS NFR BLD AUTO: 0.6 % — SIGNIFICANT CHANGE UP (ref 0–2)
BILIRUB UR-MCNC: NEGATIVE — SIGNIFICANT CHANGE UP
BLD GP AB SCN SERPL QL: SIGNIFICANT CHANGE UP
BUN SERPL-MCNC: 17 MG/DL — SIGNIFICANT CHANGE UP (ref 7–23)
CALCIUM SERPL-MCNC: 9.5 MG/DL — SIGNIFICANT CHANGE UP (ref 8.5–10.1)
CAST: 0 /LPF — SIGNIFICANT CHANGE UP (ref 0–4)
CHLORIDE SERPL-SCNC: 104 MMOL/L — SIGNIFICANT CHANGE UP (ref 96–108)
CO2 SERPL-SCNC: 28 MMOL/L — SIGNIFICANT CHANGE UP (ref 22–31)
COLOR SPEC: YELLOW — SIGNIFICANT CHANGE UP
CREAT SERPL-MCNC: 0.94 MG/DL — SIGNIFICANT CHANGE UP (ref 0.5–1.3)
DIFF PNL FLD: ABNORMAL
EGFR: 99 ML/MIN/1.73M2 — SIGNIFICANT CHANGE UP
EOSINOPHIL # BLD AUTO: 0.18 K/UL — SIGNIFICANT CHANGE UP (ref 0–0.5)
EOSINOPHIL NFR BLD AUTO: 1.5 % — SIGNIFICANT CHANGE UP (ref 0–6)
GLUCOSE SERPL-MCNC: 133 MG/DL — HIGH (ref 70–99)
GLUCOSE UR QL: NEGATIVE MG/DL — SIGNIFICANT CHANGE UP
HCT VFR BLD CALC: 45.2 % — SIGNIFICANT CHANGE UP (ref 39–50)
HGB BLD-MCNC: 15 G/DL — SIGNIFICANT CHANGE UP (ref 13–17)
IMM GRANULOCYTES NFR BLD AUTO: 0.3 % — SIGNIFICANT CHANGE UP (ref 0–0.9)
INR BLD: 1.01 RATIO — SIGNIFICANT CHANGE UP (ref 0.85–1.18)
KETONES UR-MCNC: NEGATIVE MG/DL — SIGNIFICANT CHANGE UP
LEUKOCYTE ESTERASE UR-ACNC: NEGATIVE — SIGNIFICANT CHANGE UP
LYMPHOCYTES # BLD AUTO: 29.1 % — SIGNIFICANT CHANGE UP (ref 13–44)
LYMPHOCYTES # BLD AUTO: 3.52 K/UL — HIGH (ref 1–3.3)
MCHC RBC-ENTMCNC: 28 PG — SIGNIFICANT CHANGE UP (ref 27–34)
MCHC RBC-ENTMCNC: 33.2 GM/DL — SIGNIFICANT CHANGE UP (ref 32–36)
MCV RBC AUTO: 84.3 FL — SIGNIFICANT CHANGE UP (ref 80–100)
MONOCYTES # BLD AUTO: 0.68 K/UL — SIGNIFICANT CHANGE UP (ref 0–0.9)
MONOCYTES NFR BLD AUTO: 5.6 % — SIGNIFICANT CHANGE UP (ref 2–14)
NEUTROPHILS # BLD AUTO: 7.6 K/UL — HIGH (ref 1.8–7.4)
NEUTROPHILS NFR BLD AUTO: 62.9 % — SIGNIFICANT CHANGE UP (ref 43–77)
NITRITE UR-MCNC: NEGATIVE — SIGNIFICANT CHANGE UP
PH UR: 6 — SIGNIFICANT CHANGE UP (ref 5–8)
PLATELET # BLD AUTO: 331 K/UL — SIGNIFICANT CHANGE UP (ref 150–400)
POTASSIUM SERPL-MCNC: 3.7 MMOL/L — SIGNIFICANT CHANGE UP (ref 3.5–5.3)
POTASSIUM SERPL-SCNC: 3.7 MMOL/L — SIGNIFICANT CHANGE UP (ref 3.5–5.3)
PROT UR-MCNC: NEGATIVE MG/DL — SIGNIFICANT CHANGE UP
PROTHROM AB SERPL-ACNC: 11.4 SEC — SIGNIFICANT CHANGE UP (ref 9.5–13)
RBC # BLD: 5.36 M/UL — SIGNIFICANT CHANGE UP (ref 4.2–5.8)
RBC # FLD: 14.2 % — SIGNIFICANT CHANGE UP (ref 10.3–14.5)
RBC CASTS # UR COMP ASSIST: 15 /HPF — HIGH (ref 0–4)
SODIUM SERPL-SCNC: 139 MMOL/L — SIGNIFICANT CHANGE UP (ref 135–145)
SP GR SPEC: 1.01 — SIGNIFICANT CHANGE UP (ref 1–1.03)
SQUAMOUS # UR AUTO: 0 /HPF — SIGNIFICANT CHANGE UP (ref 0–5)
UROBILINOGEN FLD QL: 0.2 MG/DL — SIGNIFICANT CHANGE UP (ref 0.2–1)
WBC # BLD: 12.09 K/UL — HIGH (ref 3.8–10.5)
WBC # FLD AUTO: 12.09 K/UL — HIGH (ref 3.8–10.5)
WBC UR QL: 0 /HPF — SIGNIFICANT CHANGE UP (ref 0–5)

## 2024-05-24 PROCEDURE — 36415 COLL VENOUS BLD VENIPUNCTURE: CPT

## 2024-05-24 PROCEDURE — 87086 URINE CULTURE/COLONY COUNT: CPT

## 2024-05-24 PROCEDURE — 86803 HEPATITIS C AB TEST: CPT

## 2024-05-24 PROCEDURE — 85730 THROMBOPLASTIN TIME PARTIAL: CPT

## 2024-05-24 PROCEDURE — 93005 ELECTROCARDIOGRAM TRACING: CPT

## 2024-05-24 PROCEDURE — 85025 COMPLETE CBC W/AUTO DIFF WBC: CPT

## 2024-05-24 PROCEDURE — 99214 OFFICE O/P EST MOD 30 MIN: CPT | Mod: 25

## 2024-05-24 PROCEDURE — 86850 RBC ANTIBODY SCREEN: CPT

## 2024-05-24 PROCEDURE — 86901 BLOOD TYPING SEROLOGIC RH(D): CPT

## 2024-05-24 PROCEDURE — 80048 BASIC METABOLIC PNL TOTAL CA: CPT

## 2024-05-24 PROCEDURE — 71046 X-RAY EXAM CHEST 2 VIEWS: CPT | Mod: 26

## 2024-05-24 PROCEDURE — 85610 PROTHROMBIN TIME: CPT

## 2024-05-24 PROCEDURE — 81001 URINALYSIS AUTO W/SCOPE: CPT

## 2024-05-24 PROCEDURE — 86900 BLOOD TYPING SEROLOGIC ABO: CPT

## 2024-05-24 PROCEDURE — 93010 ELECTROCARDIOGRAM REPORT: CPT

## 2024-05-24 PROCEDURE — 71046 X-RAY EXAM CHEST 2 VIEWS: CPT

## 2024-05-24 RX ORDER — CHOLECALCIFEROL (VITAMIN D3) 125 MCG
1 CAPSULE ORAL
Qty: 0 | Refills: 0 | DISCHARGE

## 2024-05-24 NOTE — H&P PST ADULT - HISTORY OF PRESENT ILLNESS
49 years old male with left ureteral stone. Planned cystoscopy, ureteroscopy, lithotripsy of stone with ureteral stent placement to left. 49 years old male with left ureteral stone. Reports gross hematuria 4/16/2024. Came to ER . Informed of left ureteral stone. "I have yet to pass it." Reports intermittent discomfort. left ureteral stone also in 2022. Stent placed at that time. Presently no stent.  Planned cystoscopy, ureteroscopy, lithotripsy of stone with ureteral stent placement to left.

## 2024-05-24 NOTE — H&P PST ADULT - ASSESSMENT
49 years old male present to PST prior to cystoscopy, ureteroscopy, lithotripsy of stone with ureteral stent placement to left with Dr. Carvalho.    Patient denies signs and symptoms of Covid 19 such as shortness of breath/ difficulty breathing, fever/chills, cough, muscle /body ache, headache, loss of taste or smell.    Plan   1. Education and Instructions given to patient regarding upcoming surgery  2. NPO as per ASU  3. Take the following medications with sips of water on the day of procedure: None  4.  Drink a quart of extra  fluids the day before your surgery.  5. Medical optimization for surgery with Dr. Syed  6. CBC, BMP, PT/ INR and PTT, Urinalysis, Urine Culture,  Type and Screen done in PST and sent to lab  7. EKG and Chest x- ray done in PST  8. Do not take NSAIDS, Aspirin, Herbal supplements, Multivitamins, Vitamin E or Fish oil 7 days prior to surgery

## 2024-05-24 NOTE — H&P PST ADULT - NSICDXPASTMEDICALHX_GEN_ALL_CORE_FT
PAST MEDICAL HISTORY:  GERD (gastroesophageal reflux disease)     HTN (hypertension)     Left ureteral stone     Multiple sclerosis     Obesity      PAST MEDICAL HISTORY:  GERD (gastroesophageal reflux disease)     HTN (hypertension)     Left ureteral stone     Multiple sclerosis     Obesity     Right sided sciatica

## 2024-05-25 DIAGNOSIS — Z01.818 ENCOUNTER FOR OTHER PREPROCEDURAL EXAMINATION: ICD-10-CM

## 2024-05-25 LAB
CULTURE RESULTS: NO GROWTH — SIGNIFICANT CHANGE UP
HCV AB S/CO SERPL IA: 0.19 S/CO — SIGNIFICANT CHANGE UP (ref 0–0.99)
HCV AB SERPL-IMP: SIGNIFICANT CHANGE UP
SPECIMEN SOURCE: SIGNIFICANT CHANGE UP

## 2024-05-28 DIAGNOSIS — N20.1 CALCULUS OF URETER: ICD-10-CM

## 2024-05-29 ENCOUNTER — APPOINTMENT (OUTPATIENT)
Dept: RADIOLOGY | Facility: CLINIC | Age: 49
End: 2024-05-29
Payer: COMMERCIAL

## 2024-05-29 ENCOUNTER — APPOINTMENT (OUTPATIENT)
Dept: ULTRASOUND IMAGING | Facility: CLINIC | Age: 49
End: 2024-05-29
Payer: COMMERCIAL

## 2024-05-29 ENCOUNTER — OUTPATIENT (OUTPATIENT)
Dept: OUTPATIENT SERVICES | Facility: HOSPITAL | Age: 49
LOS: 1 days | End: 2024-05-29
Payer: COMMERCIAL

## 2024-05-29 DIAGNOSIS — N20.1 CALCULUS OF URETER: ICD-10-CM

## 2024-05-29 DIAGNOSIS — Z96.0 PRESENCE OF UROGENITAL IMPLANTS: Chronic | ICD-10-CM

## 2024-05-29 DIAGNOSIS — Z98.890 OTHER SPECIFIED POSTPROCEDURAL STATES: Chronic | ICD-10-CM

## 2024-05-29 PROBLEM — M54.31 SCIATICA, RIGHT SIDE: Chronic | Status: ACTIVE | Noted: 2024-05-24

## 2024-05-29 PROCEDURE — 76770 US EXAM ABDO BACK WALL COMP: CPT | Mod: 26

## 2024-05-29 PROCEDURE — 74018 RADEX ABDOMEN 1 VIEW: CPT

## 2024-05-29 PROCEDURE — 74018 RADEX ABDOMEN 1 VIEW: CPT | Mod: 26

## 2024-05-29 PROCEDURE — 76770 US EXAM ABDO BACK WALL COMP: CPT

## 2024-05-31 LAB
APPEARANCE: CLEAR
BACTERIA: NEGATIVE /HPF
BILIRUBIN URINE: NEGATIVE
BLOOD URINE: NEGATIVE
CAST: 0 /LPF
COLOR: YELLOW
EPITHELIAL CELLS: 0 /HPF
GLUCOSE QUALITATIVE U: NEGATIVE MG/DL
KETONES URINE: NEGATIVE MG/DL
LEUKOCYTE ESTERASE URINE: NEGATIVE
MICROSCOPIC-UA: NORMAL
NITRITE URINE: NEGATIVE
PH URINE: 7
PROTEIN URINE: NEGATIVE MG/DL
RED BLOOD CELLS URINE: 0 /HPF
SPECIFIC GRAVITY URINE: 1.01
UROBILINOGEN URINE: 0.2 MG/DL
WHITE BLOOD CELLS URINE: 0 /HPF

## 2024-06-02 LAB — BACTERIA UR CULT: NORMAL

## 2024-06-03 ENCOUNTER — OUTPATIENT (OUTPATIENT)
Dept: OUTPATIENT SERVICES | Facility: HOSPITAL | Age: 49
LOS: 1 days | End: 2024-06-03
Payer: COMMERCIAL

## 2024-06-03 ENCOUNTER — APPOINTMENT (OUTPATIENT)
Dept: UROLOGY | Facility: HOSPITAL | Age: 49
End: 2024-06-03

## 2024-06-03 ENCOUNTER — RESULT REVIEW (OUTPATIENT)
Age: 49
End: 2024-06-03

## 2024-06-03 DIAGNOSIS — Z98.890 OTHER SPECIFIED POSTPROCEDURAL STATES: Chronic | ICD-10-CM

## 2024-06-03 DIAGNOSIS — Z96.0 PRESENCE OF UROGENITAL IMPLANTS: Chronic | ICD-10-CM

## 2024-06-03 PROCEDURE — 74176 CT ABD & PELVIS W/O CONTRAST: CPT | Mod: MC

## 2024-06-03 RX ORDER — ESOMEPRAZOLE MAGNESIUM 40 MG/1
1 CAPSULE, DELAYED RELEASE ORAL
Qty: 0 | Refills: 0 | DISCHARGE

## 2024-06-03 RX ORDER — LOSARTAN/HYDROCHLOROTHIAZIDE 100MG-25MG
1 TABLET ORAL
Qty: 0 | Refills: 0 | DISCHARGE

## 2024-06-03 RX ORDER — CHOLECALCIFEROL (VITAMIN D3) 125 MCG
1 CAPSULE ORAL
Refills: 0 | DISCHARGE

## 2024-06-03 RX ORDER — GLATIRAMER ACETATE 20 MG/ML
40 INJECTION, SOLUTION SUBCUTANEOUS
Qty: 0 | Refills: 0 | DISCHARGE

## 2024-06-05 LAB
KIDNEY STONE SOURCE: NORMAL
NIDUS STONE QN: NORMAL
RESULT COMMENT: NORMAL

## 2024-06-07 ENCOUNTER — APPOINTMENT (OUTPATIENT)
Dept: UROLOGY | Facility: CLINIC | Age: 49
End: 2024-06-07

## 2024-06-10 DIAGNOSIS — N20.1 CALCULUS OF URETER: ICD-10-CM

## 2024-06-11 DIAGNOSIS — N20.1 CALCULUS OF URETER: ICD-10-CM

## 2024-06-28 ENCOUNTER — APPOINTMENT (OUTPATIENT)
Dept: NEUROLOGY | Facility: CLINIC | Age: 49
End: 2024-06-28
Payer: COMMERCIAL

## 2024-06-28 VITALS
HEART RATE: 80 BPM | HEIGHT: 72 IN | SYSTOLIC BLOOD PRESSURE: 138 MMHG | DIASTOLIC BLOOD PRESSURE: 94 MMHG | WEIGHT: 312 LBS | BODY MASS INDEX: 42.26 KG/M2

## 2024-06-28 DIAGNOSIS — G35 MULTIPLE SCLEROSIS: ICD-10-CM

## 2024-06-28 PROCEDURE — G2211 COMPLEX E/M VISIT ADD ON: CPT

## 2024-06-28 PROCEDURE — 99214 OFFICE O/P EST MOD 30 MIN: CPT

## 2024-07-02 ENCOUNTER — EMERGENCY (EMERGENCY)
Facility: HOSPITAL | Age: 49
LOS: 0 days | Discharge: HOME | End: 2024-07-02
Attending: STUDENT IN AN ORGANIZED HEALTH CARE EDUCATION/TRAINING PROGRAM
Payer: COMMERCIAL

## 2024-07-02 VITALS
RESPIRATION RATE: 18 BRPM | TEMPERATURE: 99 F | HEART RATE: 75 BPM | DIASTOLIC BLOOD PRESSURE: 85 MMHG | OXYGEN SATURATION: 98 % | SYSTOLIC BLOOD PRESSURE: 135 MMHG

## 2024-07-02 VITALS — HEIGHT: 72 IN

## 2024-07-02 DIAGNOSIS — I10 ESSENTIAL (PRIMARY) HYPERTENSION: ICD-10-CM

## 2024-07-02 DIAGNOSIS — R10.9 UNSPECIFIED ABDOMINAL PAIN: ICD-10-CM

## 2024-07-02 DIAGNOSIS — N20.0 CALCULUS OF KIDNEY: ICD-10-CM

## 2024-07-02 DIAGNOSIS — Z87.442 PERSONAL HISTORY OF URINARY CALCULI: ICD-10-CM

## 2024-07-02 DIAGNOSIS — Z88.8 ALLERGY STATUS TO OTHER DRUGS, MEDICAMENTS AND BIOLOGICAL SUBSTANCES STATUS: ICD-10-CM

## 2024-07-02 DIAGNOSIS — R11.10 VOMITING, UNSPECIFIED: ICD-10-CM

## 2024-07-02 DIAGNOSIS — Z98.890 OTHER SPECIFIED POSTPROCEDURAL STATES: Chronic | ICD-10-CM

## 2024-07-02 DIAGNOSIS — G35 MULTIPLE SCLEROSIS: ICD-10-CM

## 2024-07-02 LAB
ALBUMIN SERPL ELPH-MCNC: 4 G/DL — SIGNIFICANT CHANGE UP (ref 3.3–5)
ALP SERPL-CCNC: 60 U/L — SIGNIFICANT CHANGE UP (ref 40–120)
ALT FLD-CCNC: 50 U/L — SIGNIFICANT CHANGE UP (ref 12–78)
ANION GAP SERPL CALC-SCNC: 6 MMOL/L — SIGNIFICANT CHANGE UP (ref 5–17)
APPEARANCE UR: ABNORMAL
AST SERPL-CCNC: 32 U/L — SIGNIFICANT CHANGE UP (ref 15–37)
BACTERIA # UR AUTO: NEGATIVE /HPF — SIGNIFICANT CHANGE UP
BASOPHILS # BLD AUTO: 0.05 K/UL — SIGNIFICANT CHANGE UP (ref 0–0.2)
BASOPHILS NFR BLD AUTO: 0.5 % — SIGNIFICANT CHANGE UP (ref 0–2)
BILIRUB SERPL-MCNC: 0.5 MG/DL — SIGNIFICANT CHANGE UP (ref 0.2–1.2)
BILIRUB UR-MCNC: NEGATIVE — SIGNIFICANT CHANGE UP
BUN SERPL-MCNC: 15 MG/DL — SIGNIFICANT CHANGE UP (ref 7–23)
CALCIUM SERPL-MCNC: 9.3 MG/DL — SIGNIFICANT CHANGE UP (ref 8.5–10.1)
CAST: 4 /LPF — SIGNIFICANT CHANGE UP (ref 0–4)
CHLORIDE SERPL-SCNC: 105 MMOL/L — SIGNIFICANT CHANGE UP (ref 96–108)
CO2 SERPL-SCNC: 28 MMOL/L — SIGNIFICANT CHANGE UP (ref 22–31)
COLOR SPEC: YELLOW — SIGNIFICANT CHANGE UP
COMMENT - URINE: SIGNIFICANT CHANGE UP
CREAT SERPL-MCNC: 0.98 MG/DL — SIGNIFICANT CHANGE UP (ref 0.5–1.3)
DIFF PNL FLD: ABNORMAL
EGFR: 94 ML/MIN/1.73M2 — SIGNIFICANT CHANGE UP
EOSINOPHIL # BLD AUTO: 0.09 K/UL — SIGNIFICANT CHANGE UP (ref 0–0.5)
EOSINOPHIL NFR BLD AUTO: 0.9 % — SIGNIFICANT CHANGE UP (ref 0–6)
GLUCOSE SERPL-MCNC: 128 MG/DL — HIGH (ref 70–99)
GLUCOSE UR QL: NEGATIVE MG/DL — SIGNIFICANT CHANGE UP
GRAN CASTS # UR COMP ASSIST: PRESENT
HCT VFR BLD CALC: 44.6 % — SIGNIFICANT CHANGE UP (ref 39–50)
HGB BLD-MCNC: 15.1 G/DL — SIGNIFICANT CHANGE UP (ref 13–17)
HYALINE CASTS # UR AUTO: PRESENT
IMM GRANULOCYTES NFR BLD AUTO: 0.5 % — SIGNIFICANT CHANGE UP (ref 0–0.9)
KETONES UR-MCNC: NEGATIVE MG/DL — SIGNIFICANT CHANGE UP
LEUKOCYTE ESTERASE UR-ACNC: NEGATIVE — SIGNIFICANT CHANGE UP
LIDOCAIN IGE QN: 90 U/L — HIGH (ref 13–75)
LYMPHOCYTES # BLD AUTO: 2.27 K/UL — SIGNIFICANT CHANGE UP (ref 1–3.3)
LYMPHOCYTES # BLD AUTO: 22.7 % — SIGNIFICANT CHANGE UP (ref 13–44)
MCHC RBC-ENTMCNC: 27.9 PG — SIGNIFICANT CHANGE UP (ref 27–34)
MCHC RBC-ENTMCNC: 33.9 GM/DL — SIGNIFICANT CHANGE UP (ref 32–36)
MCV RBC AUTO: 82.4 FL — SIGNIFICANT CHANGE UP (ref 80–100)
MONOCYTES # BLD AUTO: 0.48 K/UL — SIGNIFICANT CHANGE UP (ref 0–0.9)
MONOCYTES NFR BLD AUTO: 4.8 % — SIGNIFICANT CHANGE UP (ref 2–14)
NEUTROPHILS # BLD AUTO: 7.07 K/UL — SIGNIFICANT CHANGE UP (ref 1.8–7.4)
NEUTROPHILS NFR BLD AUTO: 70.6 % — SIGNIFICANT CHANGE UP (ref 43–77)
NITRITE UR-MCNC: NEGATIVE — SIGNIFICANT CHANGE UP
PH UR: 7 — SIGNIFICANT CHANGE UP (ref 5–8)
PLATELET # BLD AUTO: 286 K/UL — SIGNIFICANT CHANGE UP (ref 150–400)
POTASSIUM SERPL-MCNC: 3.7 MMOL/L — SIGNIFICANT CHANGE UP (ref 3.5–5.3)
POTASSIUM SERPL-SCNC: 3.7 MMOL/L — SIGNIFICANT CHANGE UP (ref 3.5–5.3)
PROT SERPL-MCNC: 7.5 GM/DL — SIGNIFICANT CHANGE UP (ref 6–8.3)
PROT UR-MCNC: 30 MG/DL
RBC # BLD: 5.41 M/UL — SIGNIFICANT CHANGE UP (ref 4.2–5.8)
RBC # FLD: 14 % — SIGNIFICANT CHANGE UP (ref 10.3–14.5)
RBC CASTS # UR COMP ASSIST: 20 /HPF — HIGH (ref 0–4)
SODIUM SERPL-SCNC: 139 MMOL/L — SIGNIFICANT CHANGE UP (ref 135–145)
SP GR SPEC: 1.01 — SIGNIFICANT CHANGE UP (ref 1–1.03)
SQUAMOUS # UR AUTO: 1 /HPF — SIGNIFICANT CHANGE UP (ref 0–5)
UROBILINOGEN FLD QL: 1 MG/DL — SIGNIFICANT CHANGE UP (ref 0.2–1)
WBC # BLD: 10.01 K/UL — SIGNIFICANT CHANGE UP (ref 3.8–10.5)
WBC # FLD AUTO: 10.01 K/UL — SIGNIFICANT CHANGE UP (ref 3.8–10.5)
WBC UR QL: 5 /HPF — SIGNIFICANT CHANGE UP (ref 0–5)

## 2024-07-02 PROCEDURE — 80053 COMPREHEN METABOLIC PANEL: CPT

## 2024-07-02 PROCEDURE — 36000 PLACE NEEDLE IN VEIN: CPT

## 2024-07-02 PROCEDURE — 36415 COLL VENOUS BLD VENIPUNCTURE: CPT

## 2024-07-02 PROCEDURE — 74176 CT ABD & PELVIS W/O CONTRAST: CPT | Mod: MC

## 2024-07-02 PROCEDURE — 85025 COMPLETE CBC W/AUTO DIFF WBC: CPT

## 2024-07-02 PROCEDURE — 99284 EMERGENCY DEPT VISIT MOD MDM: CPT

## 2024-07-02 PROCEDURE — 99284 EMERGENCY DEPT VISIT MOD MDM: CPT | Mod: 25

## 2024-07-02 PROCEDURE — 83690 ASSAY OF LIPASE: CPT

## 2024-07-02 PROCEDURE — 81001 URINALYSIS AUTO W/SCOPE: CPT

## 2024-07-02 PROCEDURE — 74176 CT ABD & PELVIS W/O CONTRAST: CPT | Mod: 26,MC

## 2024-07-02 RX ORDER — ACETAMINOPHEN 325 MG
975 TABLET ORAL ONCE
Refills: 0 | Status: COMPLETED | OUTPATIENT
Start: 2024-07-02 | End: 2024-07-02

## 2024-07-02 RX ADMIN — Medication 975 MILLIGRAM(S): at 07:52

## 2024-07-23 NOTE — ASU PATIENT PROFILE, ADULT - HISTORY OF COVID-19 VACCINATION
Sherita calling to see if we have a vascular provider she could see and get established with for her left arm lymphedema. Former patient of Dr. Mcneal and then Dr. Steiner last seen in 2022. Advised pt that she could see Dr. Redmond but would not be able to schedule until January or February due to upcoming leave. Pt is ok with this but for now she is going to see if Dr. Espino at the [place] location is available sooner. She will call back if she want to see Dr. Redmond.    Cynthia Ford RN, CWOCN    Yes

## 2024-07-26 ENCOUNTER — APPOINTMENT (OUTPATIENT)
Dept: NEUROLOGY | Facility: CLINIC | Age: 49
End: 2024-07-26

## 2024-08-21 ENCOUNTER — APPOINTMENT (OUTPATIENT)
Dept: UROLOGY | Facility: CLINIC | Age: 49
End: 2024-08-21
Payer: COMMERCIAL

## 2024-08-21 VITALS
OXYGEN SATURATION: 96 % | WEIGHT: 312 LBS | DIASTOLIC BLOOD PRESSURE: 81 MMHG | HEART RATE: 92 BPM | HEIGHT: 72 IN | RESPIRATION RATE: 17 BRPM | SYSTOLIC BLOOD PRESSURE: 117 MMHG | BODY MASS INDEX: 42.26 KG/M2

## 2024-08-21 DIAGNOSIS — R10.9 UNSPECIFIED ABDOMINAL PAIN: ICD-10-CM

## 2024-08-21 DIAGNOSIS — E83.50 UNSPECIFIED DISORDER OF CALCIUM METABOLISM: ICD-10-CM

## 2024-08-21 DIAGNOSIS — N20.0 CALCULUS OF KIDNEY: ICD-10-CM

## 2024-08-21 DIAGNOSIS — R82.994 HYPERCALCIURIA: ICD-10-CM

## 2024-08-21 PROCEDURE — 99214 OFFICE O/P EST MOD 30 MIN: CPT

## 2024-08-22 LAB
ANION GAP SERPL CALC-SCNC: 14 MMOL/L
BUN SERPL-MCNC: 15 MG/DL
CALCIUM SERPL-MCNC: 10.1 MG/DL
CALCIUM SERPL-MCNC: 10.1 MG/DL
CHLORIDE SERPL-SCNC: 100 MMOL/L
CO2 SERPL-SCNC: 27 MMOL/L
CREAT SERPL-MCNC: 0.82 MG/DL
EGFR: 107 ML/MIN/1.73M2
GLUCOSE SERPL-MCNC: 85 MG/DL
PARATHYROID HORMONE INTACT: 30 PG/ML
POTASSIUM SERPL-SCNC: 4.3 MMOL/L
SODIUM SERPL-SCNC: 141 MMOL/L
URATE SERPL-MCNC: 3.8 MG/DL

## 2024-08-28 PROBLEM — R10.9 LEFT FLANK PAIN: Status: ACTIVE | Noted: 2024-08-28

## 2024-08-28 NOTE — END OF VISIT
[Time Spent: ___ minutes] : I have spent [unfilled] minutes of time on the encounter which excludes teaching and separately reported services. [FreeTextEntry3] : Patient was seen with Nurse Practitioner and examined by me. Agree with above note, where necessary edits were made.   Prior to appointment and during encounter with patient extensive medical records were reviewed including but not limited to, hospital records, outpatient records, imaging results and lab data if available. During this appointment the patient was examined, diagnoses were discussed and explained in a face-to-face manner. In addition, extensive time was spent reviewing aforementioned diagnostic studies. Counseling including test results, differential diagnoses, treatment options, risk and benefits, lifestyle changes, current condition, and current medications was performed. Patient's questions and concerns were addressed. Patient verbalized understanding of the treatment plan. Time spent is for reviewing chart, labs and images if available, counseling and care coordination.

## 2024-08-28 NOTE — HISTORY OF PRESENT ILLNESS
[FreeTextEntry1] : Primary care physician: Dr. Carlos Syed, [facility].   08/21/24: 49-year-old male presents for flank pain.  Was at [facility] 07/02/24 with left sided flank pain. CT had shown 3 mm lower pole non obstructing kidney stone. Similar to prior CT.  Pain now resolved. Patient denies difficulty with urination.  Denies dysuria, hematuria, fever, chills or rigors. Had passed stone before scheduled ureteroscopy in June 2024.  Stone analysis (05/29/24) showed 60% Calcium phosphate (apatite) and 40% Calcium oxalate dihydrate. 24-hour urine (07/14/24) showed good volume, good citrate, high pH, high sodium, and high calcium.  Patient takes Losartan Potassium--25mg.   Seen on 04/18/2024 for ureteral stone.   Went to [facility] ED of 04/16/2024 with complaints of gross hematuria and slight left flank pain.   On CT scan, was found to have a 4 mm left proximal ureteral stone.  Patient was sent home on Flomax, pain medication, and nausea medication.   Had not needed pain medication. Is taking Flomax.  Gross hematuria had resolved. Did not have ant flank pain at this moment.   No bother with urination.   Before this episode, patient did have fever, chills, and diarrhea. Had COVID testing and Flu testing. Tests were negative.    CT scan Abdomen and Pelvis with IV contrast: 4 mm proximal left ureteral calculus. Mild left hydronephrosis. Additional bilateral nonobstructive intrarenal calculi measuring up to 2 mm. Redemonstrated 2.7 cm left renal mass, previously characterized as probable hemorrhagic cyst.  MRI Abdomen with and without IV contrast (1/4/2023): 2.8 cm proteinaceous/hemorrhagic cyst left kidney posteriorly.    CT scan Abdomen and Pelvis without IV contrast (9/16/2022): Five 1 mm adjacent non-obstructing calculi in the lower pole left kidney.  Indeterminate 2.7 cm hyperdense lesion mid pole left kidney.    Status post Left ureteroscopy, laser lithotripsy, stone extraction and ureteral stent exchange on 3/24/2022 for left kidney and ureteral stone.  Stone analysis: 70% Calcium oxalate monohydrate 20% Calcium phosphate (apatite) 10% Calcium oxalate dihydrate  Status post Cystoscopy and left ureteral stent placement on 3/5/2022 at [facility] for 5 mm obstructing left mid ureteral stone.    1st episode of kidney stone.  Mother has history of kidney stone.

## 2024-08-28 NOTE — LETTER BODY
[Dear  ___] : Dear  [unfilled], [Courtesy Letter:] : I had the pleasure of seeing your patient, [unfilled], in my office today. [Please see my note below.] : Please see my note below. [Sincerely,] : Sincerely, [FreeTextEntry3] : Kevin Carvalho MD\par   of Urology\par  [facility]\par  \par  [facility]\par  Offices:\par  [address] [facility], [facility]\par  [address] [address]\par  [address] [facility], [address]\par  \par  TEL: [phone]\par  FAX: [phone]

## 2024-08-28 NOTE — LETTER BODY
[Dear  ___] : Dear  [unfilled], [Courtesy Letter:] : I had the pleasure of seeing your patient, [unfilled], in my office today. [Please see my note below.] : Please see my note below. [Sincerely,] : Sincerely, [FreeTextEntry3] : Kevin Carvalho MD\par   of Urology\par  [facility]\par  \par  [facility]\par  Offices:\par  [address] [facility], [facility]\par  [address] [address]\par  [address] [facility], [address]\par  \par  TEL: [phone]\par  FAX: [phone]

## 2024-08-28 NOTE — PHYSICAL EXAM
[Normal Appearance] : normal appearance [Well Groomed] : well groomed [General Appearance - In No Acute Distress] : no acute distress [Respiration, Rhythm And Depth] : normal respiratory rhythm and effort [Exaggerated Use Of Accessory Muscles For Inspiration] : no accessory muscle use [Abdomen Soft] : soft [Abdomen Tenderness] : non-tender [Urinary Bladder Findings] : the bladder was normal on palpation [Normal Station and Gait] : the gait and station were normal for the patient's age [] : no rash [No Focal Deficits] : no focal deficits [Oriented To Time, Place, And Person] : oriented to person, place, and time [Affect] : the affect was normal [Mood] : the mood was normal [de-identified] : normal peripheral circulation

## 2024-08-28 NOTE — HISTORY OF PRESENT ILLNESS
[FreeTextEntry1] : Primary care physician: Dr. Carlos Syed, [facility].   08/21/24: 49-year-old male presents for flank pain.  Was at [facility] 07/02/24 with left sided flank pain. CT had shown 3 mm lower pole non obstructing kidney stone. Similar to prior CT.  Pain now resolved. Patient denies difficulty with urination.  Denies dysuria, hematuria, fever, chills or rigors. Had passed stone before scheduled ureteroscopy in June 2024.  Stone analysis (05/29/24) showed 60% Calcium phosphate (apatite) and 40% Calcium oxalate dihydrate. 24-hour urine (07/14/24) showed good volume, good citrate, high pH, high sodium, and high calcium.  Patient takes Losartan Potassium--25mg.   Seen on 04/18/2024 for ureteral stone.   Went to [facility] ED of 04/16/2024 with complaints of gross hematuria and slight left flank pain.   On CT scan, was found to have a 4 mm left proximal ureteral stone.  Patient was sent home on Flomax, pain medication, and nausea medication.   Had not needed pain medication. Is taking Flomax.  Gross hematuria had resolved. Did not have ant flank pain at this moment.   No bother with urination.   Before this episode, patient did have fever, chills, and diarrhea. Had COVID testing and Flu testing. Tests were negative.    CT scan Abdomen and Pelvis with IV contrast: 4 mm proximal left ureteral calculus. Mild left hydronephrosis. Additional bilateral nonobstructive intrarenal calculi measuring up to 2 mm. Redemonstrated 2.7 cm left renal mass, previously characterized as probable hemorrhagic cyst.  MRI Abdomen with and without IV contrast (1/4/2023): 2.8 cm proteinaceous/hemorrhagic cyst left kidney posteriorly.    CT scan Abdomen and Pelvis without IV contrast (9/16/2022): Five 1 mm adjacent non-obstructing calculi in the lower pole left kidney.  Indeterminate 2.7 cm hyperdense lesion mid pole left kidney.    Status post Left ureteroscopy, laser lithotripsy, stone extraction and ureteral stent exchange on 3/24/2022 for left kidney and ureteral stone.  Stone analysis: 70% Calcium oxalate monohydrate 20% Calcium phosphate (apatite) 10% Calcium oxalate dihydrate  Status post Cystoscopy and left ureteral stent placement on 3/5/2022 at [facility] for 5 mm obstructing left mid ureteral stone.    1st episode of kidney stone.  Mother has history of kidney stone.

## 2024-08-28 NOTE — PHYSICAL EXAM
[Normal Appearance] : normal appearance [Well Groomed] : well groomed [General Appearance - In No Acute Distress] : no acute distress [Respiration, Rhythm And Depth] : normal respiratory rhythm and effort [Exaggerated Use Of Accessory Muscles For Inspiration] : no accessory muscle use [Abdomen Soft] : soft [Abdomen Tenderness] : non-tender [Urinary Bladder Findings] : the bladder was normal on palpation [Normal Station and Gait] : the gait and station were normal for the patient's age [] : no rash [No Focal Deficits] : no focal deficits [Oriented To Time, Place, And Person] : oriented to person, place, and time [Affect] : the affect was normal [Mood] : the mood was normal [de-identified] : normal peripheral circulation

## 2024-08-28 NOTE — ASSESSMENT
[FreeTextEntry1] : Reviewed records. Discussed labs and imaging.   Flank pain: Discussed possibility that he passed another kidney stone.  Left kidney stone: Discussed the management options for non obstructing kidney stones- watch and wait Vs Ureteroscopy Vs Shock wave lithotripsy- if radio-opaque or ultrasound amenable.  Will observe for now.   Disorder of calcium metabolism: Hypercalciuria: 24-hour urine kidney stone risk profile showed high sodium and high calcium.  Reiterated kidney stone prevention diet.  Recommend low salt intake as it reduces calcium and urine. Less than 2400 mg per day.  Recommended discussed with PCP about increasing hydrochlorothiazide to 50 mg. Recommend to see Nephrologist and Nutritionist.   Will get Renal Bladder Ultrasound and X ray of Kidney, Ureter and Bladder before follow up appointment. Return to office in 6 months or sooner if any issues.

## 2025-01-17 NOTE — H&P PST ADULT - VISION (WITH CORRECTIVE LENSES IF THE PATIENT USUALLY WEARS THEM):
Occupational Therapy Visit    Visit Type: Daily Treatment Note  Visit: 7  Referring Provider: VINCE Ashley*  Medical Diagnosis (from order): M77.11 - Lateral epicondylitis of right elbow   Medical Diagnosis (from order): Date of Surgery: 11/14/24--Right extensor carpi radialis brevis debridement and repair  Chart reviewed at time of initial evaluation (relevant co-morbidities, allergies, tests and medications listed):  Saw ortho VINCE Humphrey on 11/29/24 with following excerpts from her note:  He is wearing wrist brace.  He is to wear this for 6 weeks.  Stressed imprortance of not stressing repair.  OT once a week for for ROM of elbow.  No work at this time.     Once weekly   Can start 3 weeks after surgery (DOS: 11/14/24) \"________  __________    Surgery was performed on 11/14/24 for ECRB debridement Surgery: right extensor carpi radialis brevis debridement and repair   Date of Surgery: 11/14/24  ___________________  Saw Dr. Ball on 8/20/24 and since he failed conservative course of treatment to improve symptoms and did not want cortisone injuecction, surgery was recommended.  ________________________  Attended therapy from 7/1024 to 8/1/24 for 7 visits.  He was discharged as he was following up with ortho.  __________  Saw Margo Torres DO on 6/18/24 for right elbow pain that he has had for about a month.  Notes from office: \" It came on gradually. He describes it as achy. Overall, he does feel his elbow has improved 75%. Today, his pain is 2 out of 10. It can range from 0-4 out of 10. It is worse when he moves around or he straighten his arm. Nothing seems to make it better. He does take ibuprofen for pain relief. He does feel that there is some tingling in his arm. He does not feel weak. He has never had injections or surgeries. \" \"Ultrasound did show that he has tendinosis with a partial tendon tear. We discussed conservative and interventional measures. \"  Therapy was ordered.              SUBJECTIVE                                                                                                               Had 1 day of pain after he was last here but feels like he is now doing better.  He doesn't have pain with walking and this was what was really aggravating for him, with arm hanging down to side.   Functional Change: Shoulder moving better.  Frustrated that elbow still hurts    Pain / Symptoms  - Pain rating (out of 10): Current: 2     Patient Goals: decreased edema, increased motion, decreased pain and increased strength.     OBJECTIVE                                                                                                                                          Treatment     Therapeutic Exercise  Supine right shoulder motion  Supine right elbow flexion/extension at 0 degrees of shoulder flexion/60 degrees, 90 degrees  Supine gentle supination/pronation  Finger motion    Manual Therapy   Scar mobilization.  Does appear to have one dissolvable suture that is being a bit stubborn that is present on the suture line.  No s/s of infection.      Was given a piece of silicone gel that he can use for scar after the dissolvable suture comes out.      Use of dycem to mobilize skin around the elbow, biceps triceps area with noted reduction of pain.     Skilled input: verbal instruction/cues and demonstration    Writer verbally educated and received verbal consent for hand placement, positioning of patient, and techniques to be performed today from patient for clothing adjustments for techniques and hand placement and palpation for techniques as described above and how they are pertinent to the patient's plan of care.  Home Exercise Program  *above indicates provided as part of home exercise program      ASSESSMENT                                                                                                            Patient continues to require skills of a therapist  Education:   -  Results of above outlined education: Verbalizes understanding and Needs reinforcement    PLAN                                                                                                                           Suggestions for next session as indicated: Progress per plan of care       Therapy procedure time and total treatment time can be found documented on the Time Entry flowsheet   Normal vision: sees adequately in most situations; can see medication labels, newsprint

## 2025-02-12 ENCOUNTER — APPOINTMENT (OUTPATIENT)
Dept: ULTRASOUND IMAGING | Facility: CLINIC | Age: 50
End: 2025-02-12

## 2025-02-12 ENCOUNTER — APPOINTMENT (OUTPATIENT)
Dept: RADIOLOGY | Facility: CLINIC | Age: 50
End: 2025-02-12

## 2025-02-12 ENCOUNTER — OUTPATIENT (OUTPATIENT)
Dept: OUTPATIENT SERVICES | Facility: HOSPITAL | Age: 50
LOS: 1 days | End: 2025-02-12

## 2025-02-12 DIAGNOSIS — N20.0 CALCULUS OF KIDNEY: ICD-10-CM

## 2025-02-12 DIAGNOSIS — Z98.890 OTHER SPECIFIED POSTPROCEDURAL STATES: Chronic | ICD-10-CM

## 2025-02-12 DIAGNOSIS — Z96.0 PRESENCE OF UROGENITAL IMPLANTS: Chronic | ICD-10-CM

## 2025-02-20 ENCOUNTER — APPOINTMENT (OUTPATIENT)
Dept: RADIOLOGY | Facility: CLINIC | Age: 50
End: 2025-02-20
Payer: COMMERCIAL

## 2025-02-20 ENCOUNTER — OUTPATIENT (OUTPATIENT)
Dept: OUTPATIENT SERVICES | Facility: HOSPITAL | Age: 50
LOS: 1 days | End: 2025-02-20
Payer: COMMERCIAL

## 2025-02-20 ENCOUNTER — APPOINTMENT (OUTPATIENT)
Dept: ULTRASOUND IMAGING | Facility: CLINIC | Age: 50
End: 2025-02-20
Payer: COMMERCIAL

## 2025-02-20 DIAGNOSIS — Z96.0 PRESENCE OF UROGENITAL IMPLANTS: Chronic | ICD-10-CM

## 2025-02-20 DIAGNOSIS — Z98.890 OTHER SPECIFIED POSTPROCEDURAL STATES: Chronic | ICD-10-CM

## 2025-02-20 PROCEDURE — 74018 RADEX ABDOMEN 1 VIEW: CPT | Mod: 26

## 2025-02-20 PROCEDURE — 76770 US EXAM ABDO BACK WALL COMP: CPT

## 2025-02-20 PROCEDURE — 74018 RADEX ABDOMEN 1 VIEW: CPT

## 2025-02-20 PROCEDURE — 76770 US EXAM ABDO BACK WALL COMP: CPT | Mod: 26

## 2025-02-26 ENCOUNTER — APPOINTMENT (OUTPATIENT)
Dept: UROLOGY | Facility: CLINIC | Age: 50
End: 2025-02-26
Payer: COMMERCIAL

## 2025-02-26 VITALS
RESPIRATION RATE: 16 BRPM | SYSTOLIC BLOOD PRESSURE: 123 MMHG | BODY MASS INDEX: 42.26 KG/M2 | DIASTOLIC BLOOD PRESSURE: 88 MMHG | WEIGHT: 312 LBS | HEIGHT: 72 IN | HEART RATE: 86 BPM | OXYGEN SATURATION: 98 %

## 2025-02-26 DIAGNOSIS — E83.50 UNSPECIFIED DISORDER OF CALCIUM METABOLISM: ICD-10-CM

## 2025-02-26 DIAGNOSIS — R82.994 HYPERCALCIURIA: ICD-10-CM

## 2025-02-26 DIAGNOSIS — N28.1 CYST OF KIDNEY, ACQUIRED: ICD-10-CM

## 2025-02-26 DIAGNOSIS — N20.0 CALCULUS OF KIDNEY: ICD-10-CM

## 2025-02-26 DIAGNOSIS — R31.0 GROSS HEMATURIA: ICD-10-CM

## 2025-02-26 PROCEDURE — 99214 OFFICE O/P EST MOD 30 MIN: CPT

## 2025-02-28 ENCOUNTER — RESULT REVIEW (OUTPATIENT)
Age: 50
End: 2025-02-28

## 2025-02-28 LAB — BACTERIA UR CULT: NORMAL

## 2025-03-04 ENCOUNTER — APPOINTMENT (OUTPATIENT)
Dept: CT IMAGING | Facility: CLINIC | Age: 50
End: 2025-03-04
Payer: COMMERCIAL

## 2025-03-04 ENCOUNTER — OUTPATIENT (OUTPATIENT)
Dept: OUTPATIENT SERVICES | Facility: HOSPITAL | Age: 50
LOS: 1 days | End: 2025-03-04
Payer: COMMERCIAL

## 2025-03-04 DIAGNOSIS — Z98.890 OTHER SPECIFIED POSTPROCEDURAL STATES: Chronic | ICD-10-CM

## 2025-03-04 DIAGNOSIS — Z96.0 PRESENCE OF UROGENITAL IMPLANTS: Chronic | ICD-10-CM

## 2025-03-04 PROCEDURE — 74178 CT ABD&PLV WO CNTR FLWD CNTR: CPT

## 2025-03-04 PROCEDURE — 74178 CT ABD&PLV WO CNTR FLWD CNTR: CPT | Mod: 26

## 2025-03-05 LAB — URINE CYTOLOGY: NORMAL

## 2025-03-06 ENCOUNTER — EMERGENCY (EMERGENCY)
Facility: HOSPITAL | Age: 50
LOS: 0 days | Discharge: HOME | End: 2025-03-06
Attending: EMERGENCY MEDICINE
Payer: COMMERCIAL

## 2025-03-06 VITALS
TEMPERATURE: 99 F | DIASTOLIC BLOOD PRESSURE: 93 MMHG | SYSTOLIC BLOOD PRESSURE: 154 MMHG | OXYGEN SATURATION: 95 % | RESPIRATION RATE: 18 BRPM | HEART RATE: 89 BPM

## 2025-03-06 VITALS
DIASTOLIC BLOOD PRESSURE: 89 MMHG | SYSTOLIC BLOOD PRESSURE: 148 MMHG | RESPIRATION RATE: 17 BRPM | HEART RATE: 77 BPM | OXYGEN SATURATION: 96 % | TEMPERATURE: 99 F

## 2025-03-06 DIAGNOSIS — Z96.0 PRESENCE OF UROGENITAL IMPLANTS: ICD-10-CM

## 2025-03-06 DIAGNOSIS — I10 ESSENTIAL (PRIMARY) HYPERTENSION: ICD-10-CM

## 2025-03-06 DIAGNOSIS — Z88.8 ALLERGY STATUS TO OTHER DRUGS, MEDICAMENTS AND BIOLOGICAL SUBSTANCES: ICD-10-CM

## 2025-03-06 DIAGNOSIS — Z96.0 PRESENCE OF UROGENITAL IMPLANTS: Chronic | ICD-10-CM

## 2025-03-06 DIAGNOSIS — Z98.890 OTHER SPECIFIED POSTPROCEDURAL STATES: Chronic | ICD-10-CM

## 2025-03-06 DIAGNOSIS — K21.9 GASTRO-ESOPHAGEAL REFLUX DISEASE WITHOUT ESOPHAGITIS: ICD-10-CM

## 2025-03-06 DIAGNOSIS — R07.9 CHEST PAIN, UNSPECIFIED: ICD-10-CM

## 2025-03-06 DIAGNOSIS — G35 MULTIPLE SCLEROSIS: ICD-10-CM

## 2025-03-06 LAB
BASOPHILS # BLD AUTO: 0.07 K/UL — SIGNIFICANT CHANGE UP (ref 0–0.2)
BASOPHILS NFR BLD AUTO: 0.6 % — SIGNIFICANT CHANGE UP (ref 0–2)
EOSINOPHIL # BLD AUTO: 0.19 K/UL — SIGNIFICANT CHANGE UP (ref 0–0.5)
EOSINOPHIL NFR BLD AUTO: 1.7 % — SIGNIFICANT CHANGE UP (ref 0–6)
HCT VFR BLD CALC: 45.8 % — SIGNIFICANT CHANGE UP (ref 39–50)
HGB BLD-MCNC: 15.3 G/DL — SIGNIFICANT CHANGE UP (ref 13–17)
IMM GRANULOCYTES # BLD AUTO: 0.04 K/UL — SIGNIFICANT CHANGE UP (ref 0–0.07)
IMM GRANULOCYTES NFR BLD AUTO: 0.3 % — SIGNIFICANT CHANGE UP (ref 0–0.9)
LYMPHOCYTES # BLD AUTO: 3 K/UL — SIGNIFICANT CHANGE UP (ref 1–3.3)
LYMPHOCYTES NFR BLD AUTO: 26.2 % — SIGNIFICANT CHANGE UP (ref 13–44)
MCHC RBC-ENTMCNC: 27.7 PG — SIGNIFICANT CHANGE UP (ref 27–34)
MCHC RBC-ENTMCNC: 33.4 G/DL — SIGNIFICANT CHANGE UP (ref 32–36)
MCV RBC AUTO: 83 FL — SIGNIFICANT CHANGE UP (ref 80–100)
MONOCYTES # BLD AUTO: 0.74 K/UL — SIGNIFICANT CHANGE UP (ref 0–0.9)
MONOCYTES NFR BLD AUTO: 6.5 % — SIGNIFICANT CHANGE UP (ref 2–14)
NEUTROPHILS # BLD AUTO: 7.43 K/UL — HIGH (ref 1.8–7.4)
NEUTROPHILS NFR BLD AUTO: 64.7 % — SIGNIFICANT CHANGE UP (ref 43–77)
NRBC # BLD AUTO: 0 K/UL — SIGNIFICANT CHANGE UP (ref 0–0)
NRBC # FLD: 0 K/UL — SIGNIFICANT CHANGE UP (ref 0–0)
NRBC BLD AUTO-RTO: 0 /100 WBCS — SIGNIFICANT CHANGE UP (ref 0–0)
PLATELET # BLD AUTO: 318 K/UL — SIGNIFICANT CHANGE UP (ref 150–400)
PMV BLD: 9.6 FL — SIGNIFICANT CHANGE UP (ref 7–13)
RBC # BLD: 5.52 M/UL — SIGNIFICANT CHANGE UP (ref 4.2–5.8)
RBC # FLD: 14.1 % — SIGNIFICANT CHANGE UP (ref 10.3–14.5)
WBC # BLD: 11.47 K/UL — HIGH (ref 3.8–10.5)
WBC # FLD AUTO: 11.47 K/UL — HIGH (ref 3.8–10.5)

## 2025-03-06 PROCEDURE — 99285 EMERGENCY DEPT VISIT HI MDM: CPT

## 2025-03-06 PROCEDURE — 36415 COLL VENOUS BLD VENIPUNCTURE: CPT

## 2025-03-06 PROCEDURE — 84484 ASSAY OF TROPONIN QUANT: CPT

## 2025-03-06 PROCEDURE — 99285 EMERGENCY DEPT VISIT HI MDM: CPT | Mod: 25

## 2025-03-06 PROCEDURE — 93010 ELECTROCARDIOGRAM REPORT: CPT

## 2025-03-06 PROCEDURE — 96374 THER/PROPH/DIAG INJ IV PUSH: CPT

## 2025-03-06 PROCEDURE — 93005 ELECTROCARDIOGRAM TRACING: CPT

## 2025-03-06 PROCEDURE — 71045 X-RAY EXAM CHEST 1 VIEW: CPT

## 2025-03-06 PROCEDURE — 80053 COMPREHEN METABOLIC PANEL: CPT

## 2025-03-06 PROCEDURE — 71045 X-RAY EXAM CHEST 1 VIEW: CPT | Mod: 26

## 2025-03-06 PROCEDURE — 85025 COMPLETE CBC W/AUTO DIFF WBC: CPT

## 2025-03-06 RX ORDER — KETOROLAC TROMETHAMINE 30 MG/ML
15 INJECTION, SOLUTION INTRAMUSCULAR; INTRAVENOUS ONCE
Refills: 0 | Status: DISCONTINUED | OUTPATIENT
Start: 2025-03-06 | End: 2025-03-06

## 2025-03-06 RX ADMIN — KETOROLAC TROMETHAMINE 15 MILLIGRAM(S): 30 INJECTION, SOLUTION INTRAMUSCULAR; INTRAVENOUS at 14:56

## 2025-03-06 NOTE — ED STATDOCS - CLINICAL SUMMARY MEDICAL DECISION MAKING FREE TEXT BOX
50 y/o M , hx of HTN , MS, presents to the ED c/o chest pain over 24 hours. EKG NSR with no acute ST changes. Check trop. if unremarkable, pt should f/u with cardiologist.

## 2025-03-06 NOTE — ED ADULT TRIAGE NOTE - CHIEF COMPLAINT QUOTE
pt presents to ED due to complaints of Chest pain since yesterday 3p denies any jaw or arm pain pt taken to intake for STAT EKG denies any med hx

## 2025-03-06 NOTE — ED STATDOCS - PROGRESS NOTE DETAILS
Patient seen and evaluated, ED attending note and orders reviewed, will continue with patient follow up and care -Yas Hernandez PA-C labs WNL, troponin negative, CXR with NAD, EKG NSR, pt feels well currently pain likely msk as pt states he feels his muscle spasm through his shirt, however at length discussion with patient regarding nature of the chest pain. Discussed results of all diagnostic testing in ED. Discussed chest pain precautions and instructions. Patient demonstrates understanding that a cardiac cause of the chest pain has not been totally excluded, but at this time there is no evidence to warrant an inpatient workup.  Discussed the importance of continued follow-up with Cardiology as outpatient to complete cardiac workup. Advised pt to call his cardiologist for a prompt f/u, pt also has f/u with his PMD scheduled, will dc home with outpt f/u return precautions given  Yas Hernandez PA-C

## 2025-03-06 NOTE — ED STATDOCS - OBJECTIVE STATEMENT
48 y/o M with PMHx of GERD, obesity, HTN, sciatica,  MS, s/p ureteral stent, presents to ED c/o dull non radiating chest pain and "spasm" since yesterday.   Denies SOB, nausea. Pt saw cardiology in 2022 and had a stress done completed then.  +Father hx CAD

## 2025-03-06 NOTE — ED STATDOCS - PATIENT PORTAL LINK FT
You can access the FollowMyHealth Patient Portal offered by [facility] by registering at the following website: [email or web address]. By joining [employer]’s FollowMyHealth portal, you will also be able to view your health information using other applications (apps) compatible with our system.

## 2025-03-06 NOTE — ED STATDOCS - NSICDXPASTMEDICALHX_GEN_ALL_CORE_FT
PAST MEDICAL HISTORY:  GERD (gastroesophageal reflux disease)     HTN (hypertension)     Left ureteral stone     Multiple sclerosis     Obesity     Right sided sciatica

## 2025-03-13 ENCOUNTER — OUTPATIENT (OUTPATIENT)
Dept: OUTPATIENT SERVICES | Facility: HOSPITAL | Age: 50
LOS: 1 days | End: 2025-03-13

## 2025-03-13 ENCOUNTER — APPOINTMENT (OUTPATIENT)
Dept: INTERNAL MEDICINE | Facility: CLINIC | Age: 50
End: 2025-03-13

## 2025-03-13 DIAGNOSIS — Z96.0 PRESENCE OF UROGENITAL IMPLANTS: Chronic | ICD-10-CM

## 2025-03-13 DIAGNOSIS — Z98.890 OTHER SPECIFIED POSTPROCEDURAL STATES: Chronic | ICD-10-CM

## 2025-03-26 ENCOUNTER — EMERGENCY (EMERGENCY)
Facility: HOSPITAL | Age: 50
LOS: 0 days | Discharge: HOME | End: 2025-03-27
Attending: EMERGENCY MEDICINE
Payer: COMMERCIAL

## 2025-03-26 ENCOUNTER — NON-APPOINTMENT (OUTPATIENT)
Age: 50
End: 2025-03-26

## 2025-03-26 VITALS — HEIGHT: 72 IN | WEIGHT: 315 LBS

## 2025-03-26 DIAGNOSIS — Z96.0 PRESENCE OF UROGENITAL IMPLANTS: Chronic | ICD-10-CM

## 2025-03-26 PROCEDURE — 96374 THER/PROPH/DIAG INJ IV PUSH: CPT

## 2025-03-26 PROCEDURE — 36415 COLL VENOUS BLD VENIPUNCTURE: CPT

## 2025-03-26 PROCEDURE — 96376 TX/PRO/DX INJ SAME DRUG ADON: CPT

## 2025-03-26 PROCEDURE — 99284 EMERGENCY DEPT VISIT MOD MDM: CPT | Mod: 25

## 2025-03-26 PROCEDURE — 99285 EMERGENCY DEPT VISIT HI MDM: CPT

## 2025-03-26 PROCEDURE — 80053 COMPREHEN METABOLIC PANEL: CPT

## 2025-03-26 PROCEDURE — 81001 URINALYSIS AUTO W/SCOPE: CPT

## 2025-03-26 PROCEDURE — 96375 TX/PRO/DX INJ NEW DRUG ADDON: CPT

## 2025-03-26 PROCEDURE — 74176 CT ABD & PELVIS W/O CONTRAST: CPT | Mod: MC

## 2025-03-26 PROCEDURE — 85025 COMPLETE CBC W/AUTO DIFF WBC: CPT

## 2025-03-26 RX ORDER — ONDANSETRON HCL/PF 4 MG/2 ML
4 VIAL (ML) INJECTION ONCE
Refills: 0 | Status: COMPLETED | OUTPATIENT
Start: 2025-03-26 | End: 2025-03-26

## 2025-03-26 NOTE — ED ADULT TRIAGE NOTE - CHIEF COMPLAINT QUOTE
Pt ambulatory to the ED c/o right flank pain. Pt reports endorsing right sided flank pain since 12PM today. Pt reports he had an episode of vomiting earlier today, denies nausea, fever, and urinary symptoms at this time. Pt last took Advil 1 hr PTA, with mild relief. Pt reports hx of kidney stones.

## 2025-03-27 VITALS
DIASTOLIC BLOOD PRESSURE: 76 MMHG | SYSTOLIC BLOOD PRESSURE: 123 MMHG | TEMPERATURE: 98 F | RESPIRATION RATE: 16 BRPM | HEART RATE: 90 BPM | OXYGEN SATURATION: 96 %

## 2025-03-27 DIAGNOSIS — N23 UNSPECIFIED RENAL COLIC: ICD-10-CM

## 2025-03-27 DIAGNOSIS — M54.50 LOW BACK PAIN, UNSPECIFIED: ICD-10-CM

## 2025-03-27 DIAGNOSIS — Z87.442 PERSONAL HISTORY OF URINARY CALCULI: ICD-10-CM

## 2025-03-27 DIAGNOSIS — Z88.8 ALLERGY STATUS TO OTHER DRUGS, MEDICAMENTS AND BIOLOGICAL SUBSTANCES: ICD-10-CM

## 2025-03-27 LAB
ALBUMIN SERPL ELPH-MCNC: 3.6 G/DL — SIGNIFICANT CHANGE UP (ref 3.3–5)
ALP SERPL-CCNC: 56 U/L — SIGNIFICANT CHANGE UP (ref 40–120)
ALT FLD-CCNC: 64 U/L — SIGNIFICANT CHANGE UP (ref 12–78)
ANION GAP SERPL CALC-SCNC: 4 MMOL/L — LOW (ref 5–17)
APPEARANCE UR: CLEAR — SIGNIFICANT CHANGE UP
AST SERPL-CCNC: 32 U/L — SIGNIFICANT CHANGE UP (ref 15–37)
BACTERIA # UR AUTO: NEGATIVE /HPF — SIGNIFICANT CHANGE UP
BASOPHILS # BLD AUTO: 0.07 K/UL — SIGNIFICANT CHANGE UP (ref 0–0.2)
BASOPHILS NFR BLD AUTO: 0.5 % — SIGNIFICANT CHANGE UP (ref 0–2)
BILIRUB SERPL-MCNC: 0.5 MG/DL — SIGNIFICANT CHANGE UP (ref 0.2–1.2)
BILIRUB UR-MCNC: NEGATIVE — SIGNIFICANT CHANGE UP
BUN SERPL-MCNC: 16 MG/DL — SIGNIFICANT CHANGE UP (ref 7–23)
CALCIUM SERPL-MCNC: 9.3 MG/DL — SIGNIFICANT CHANGE UP (ref 8.5–10.1)
CAST: 0 /LPF — SIGNIFICANT CHANGE UP (ref 0–4)
CHLORIDE SERPL-SCNC: 106 MMOL/L — SIGNIFICANT CHANGE UP (ref 96–108)
CO2 SERPL-SCNC: 29 MMOL/L — SIGNIFICANT CHANGE UP (ref 22–31)
COLOR SPEC: YELLOW — SIGNIFICANT CHANGE UP
CREAT SERPL-MCNC: 0.99 MG/DL — SIGNIFICANT CHANGE UP (ref 0.5–1.3)
DIFF PNL FLD: ABNORMAL
EGFR: 93 ML/MIN/1.73M2 — SIGNIFICANT CHANGE UP
EGFR: 93 ML/MIN/1.73M2 — SIGNIFICANT CHANGE UP
EOSINOPHIL # BLD AUTO: 0.18 K/UL — SIGNIFICANT CHANGE UP (ref 0–0.5)
EOSINOPHIL NFR BLD AUTO: 1.3 % — SIGNIFICANT CHANGE UP (ref 0–6)
GLUCOSE SERPL-MCNC: 175 MG/DL — HIGH (ref 70–99)
GLUCOSE UR QL: NEGATIVE MG/DL — SIGNIFICANT CHANGE UP
HCT VFR BLD CALC: 44 % — SIGNIFICANT CHANGE UP (ref 39–50)
HGB BLD-MCNC: 14.8 G/DL — SIGNIFICANT CHANGE UP (ref 13–17)
IMM GRANULOCYTES # BLD AUTO: 0.06 K/UL — SIGNIFICANT CHANGE UP (ref 0–0.07)
IMM GRANULOCYTES NFR BLD AUTO: 0.4 % — SIGNIFICANT CHANGE UP (ref 0–0.9)
KETONES UR-MCNC: ABNORMAL MG/DL
LEUKOCYTE ESTERASE UR-ACNC: ABNORMAL
LYMPHOCYTES # BLD AUTO: 2.25 K/UL — SIGNIFICANT CHANGE UP (ref 1–3.3)
LYMPHOCYTES NFR BLD AUTO: 16 % — SIGNIFICANT CHANGE UP (ref 13–44)
MCHC RBC-ENTMCNC: 27.9 PG — SIGNIFICANT CHANGE UP (ref 27–34)
MCHC RBC-ENTMCNC: 33.6 G/DL — SIGNIFICANT CHANGE UP (ref 32–36)
MCV RBC AUTO: 82.9 FL — SIGNIFICANT CHANGE UP (ref 80–100)
MONOCYTES # BLD AUTO: 0.81 K/UL — SIGNIFICANT CHANGE UP (ref 0–0.9)
MONOCYTES NFR BLD AUTO: 5.8 % — SIGNIFICANT CHANGE UP (ref 2–14)
NEUTROPHILS # BLD AUTO: 10.67 K/UL — HIGH (ref 1.8–7.4)
NEUTROPHILS NFR BLD AUTO: 76 % — SIGNIFICANT CHANGE UP (ref 43–77)
NITRITE UR-MCNC: NEGATIVE — SIGNIFICANT CHANGE UP
NRBC # BLD AUTO: 0 K/UL — SIGNIFICANT CHANGE UP (ref 0–0)
NRBC # FLD: 0 K/UL — SIGNIFICANT CHANGE UP (ref 0–0)
NRBC BLD AUTO-RTO: 0 /100 WBCS — SIGNIFICANT CHANGE UP (ref 0–0)
PH UR: 5.5 — SIGNIFICANT CHANGE UP (ref 5–8)
PLATELET # BLD AUTO: 236 K/UL — SIGNIFICANT CHANGE UP (ref 150–400)
PMV BLD: 9 FL — SIGNIFICANT CHANGE UP (ref 7–13)
POTASSIUM SERPL-MCNC: 3.5 MMOL/L — SIGNIFICANT CHANGE UP (ref 3.5–5.3)
POTASSIUM SERPL-SCNC: 3.5 MMOL/L — SIGNIFICANT CHANGE UP (ref 3.5–5.3)
PROT SERPL-MCNC: 6.8 GM/DL — SIGNIFICANT CHANGE UP (ref 6–8.3)
PROT UR-MCNC: SIGNIFICANT CHANGE UP MG/DL
RBC # BLD: 5.31 M/UL — SIGNIFICANT CHANGE UP (ref 4.2–5.8)
RBC # FLD: 14 % — SIGNIFICANT CHANGE UP (ref 10.3–14.5)
RBC CASTS # UR COMP ASSIST: 274 /HPF — HIGH (ref 0–4)
SODIUM SERPL-SCNC: 139 MMOL/L — SIGNIFICANT CHANGE UP (ref 135–145)
SP GR SPEC: 1.02 — SIGNIFICANT CHANGE UP (ref 1–1.03)
SQUAMOUS # UR AUTO: 0 /HPF — SIGNIFICANT CHANGE UP (ref 0–5)
UROBILINOGEN FLD QL: 1 MG/DL — SIGNIFICANT CHANGE UP (ref 0.2–1)
WBC # BLD: 14.04 K/UL — HIGH (ref 3.8–10.5)
WBC # FLD AUTO: 14.04 K/UL — HIGH (ref 3.8–10.5)
WBC UR QL: 4 /HPF — SIGNIFICANT CHANGE UP (ref 0–5)

## 2025-03-27 PROCEDURE — 74176 CT ABD & PELVIS W/O CONTRAST: CPT | Mod: 26

## 2025-03-27 RX ORDER — KETOROLAC TROMETHAMINE 30 MG/ML
15 INJECTION, SOLUTION INTRAMUSCULAR; INTRAVENOUS ONCE
Refills: 0 | Status: DISCONTINUED | OUTPATIENT
Start: 2025-03-27 | End: 2025-03-27

## 2025-03-27 RX ORDER — ONDANSETRON HCL/PF 4 MG/2 ML
1 VIAL (ML) INJECTION
Qty: 12 | Refills: 0
Start: 2025-03-27 | End: 2025-03-29

## 2025-03-27 RX ORDER — TAMSULOSIN HYDROCHLORIDE 0.4 MG/1
1 CAPSULE ORAL
Qty: 14 | Refills: 0
Start: 2025-03-27 | End: 2025-04-09

## 2025-03-27 RX ADMIN — Medication 2000 MILLILITER(S): at 00:03

## 2025-03-27 RX ADMIN — Medication 4 MILLIGRAM(S): at 00:03

## 2025-03-27 RX ADMIN — KETOROLAC TROMETHAMINE 15 MILLIGRAM(S): 30 INJECTION, SOLUTION INTRAMUSCULAR; INTRAVENOUS at 01:42

## 2025-03-27 RX ADMIN — Medication 4 MILLIGRAM(S): at 00:05

## 2025-03-27 RX ADMIN — KETOROLAC TROMETHAMINE 15 MILLIGRAM(S): 30 INJECTION, SOLUTION INTRAMUSCULAR; INTRAVENOUS at 03:22

## 2025-03-27 NOTE — ED ADULT NURSE NOTE - OBJECTIVE STATEMENT
Pt presents to the ED w c/o right flank pain. Pt is A&Ox3 and ambulatory at baseline. Endorses nausea. Denies increased urinary frequency.

## 2025-03-27 NOTE — ED PROVIDER NOTE - PATIENT PORTAL LINK FT
You can access the FollowMyHealth Patient Portal offered by [facility] by registering at the following website: [email or web address]. By joining [employer]’s FollowMyHealth portal, you will also be able to view your health information using other applications (apps) compatible with our system.

## 2025-03-27 NOTE — ED PROVIDER NOTE - OBJECTIVE STATEMENT
49-year-old male past medical history renal stones presents with right low back pain.  Feels similar to previous stones.  Took 600 ibuprofen prior to arrival.

## 2025-03-27 NOTE — ED ADULT NURSE NOTE - NSFALLUNIVINTERV_ED_ALL_ED
Bed/Stretcher in lowest position, wheels locked, appropriate side rails in place/Call bell, personal items and telephone in reach/Instruct patient to call for assistance before getting out of bed/chair/stretcher/Non-slip footwear applied when patient is off stretcher/[place] to call system/Physically safe environment - no spills, clutter or unnecessary equipment/Purposeful proactive rounding/Room/bathroom lighting operational, light cord in reach

## 2025-03-27 NOTE — ED PROVIDER NOTE - NSFOLLOWUPINSTRUCTIONS_ED_ALL_ED_FT
Please take 600 mg ibuprofen every 6 hours for pain.  Please take 1000 mg acetaminophen every 6 hours for pain.  You may take 4 mg Zofran every 8 hours for nausea.  Please take Flomax  daily.  Please follow-up with your urologist.    Renal Colic  The urinary tract with a close-up of a kidney with kidney stones.  Renal colic is pain that is caused by passing a kidney stone. The pain can be sharp and severe. It may be felt in the back, abdomen, side (flank), or groin. It can cause nausea. Renal colic can come and go.    Follow these instructions at home:  Watch your condition for any changes. The following actions may help to lessen any discomfort that you are feeling:    Medicines    Take over-the-counter and prescription medicines only as told by your health care provider.  Do not drive or use heavy machinery while taking prescription pain medicine.  Eating and drinking    A comparison of three sample cups showing dark yellow, yellow, and pale yellow urine.  Drink enough fluid to keep your urine pale yellow. You may be instructed to drink at least 8–10 glasses of water each day. Follow instructions from your health care provider.  If directed, change your diet. This may include:  Limiting how much sodium you eat. You may need to eat less than 2 grams (2,000 mg) per day.  Eating more fruits and vegetables.  Limiting how much animal protein, such as red meat, poultry, fish, and eggs, you eat.  Avoiding foods such as spinach, rhubarb, sweet potatoes, and nuts. These make kidney stones more likely to form.  Follow instructions from your health care provider about eating or drinking restrictions.  General instructions    Keep all follow-up visits as told by your health care provider. This is important.  Collect urine samples as told by your health care provider. You may need to collect a urine sample:  24 hours after you pass the stone.  8–12 weeks after passing the kidney stone, and every 6–12 months after that.  Strain your urine every time you urinate, for as long as directed. Use the strainer that your health care provider recommends.  Do not throw out the kidney stone after passing it. Keep the stone so it can be tested by your health care provider. Testing the makeup of your kidney stone may help understand how to prevent you from getting kidney stones in the future.  Contact a health care provider if:  You have a fever or chills.  Your urine smells bad or looks cloudy.  You have pain or burning when you pass urine.  Get help right away if:  Your flank pain or groin pain suddenly worsens.  You become confused or disoriented or you lose consciousness.  Summary  Renal colic is pain that is caused by passing a kidney stone.  Take over-the-counter and prescription medicines only as told by your health care provider.  Drink enough fluid to keep your urine pale yellow. You may be instructed to drink at least 8–10 glasses of water each day. Follow instructions from your health care provider.  Strain your urine every time you urinate, for as long as directed. Use the strainer that your health care provider recommends.  Do not throw out the kidney stone after passing it. Keep the stone so it can be tested by your health care provider.  This information is not intended to replace advice given to you by your health care provider. Make sure you discuss any questions you have with your health care provider.    Document Revised: 08/21/2022 Document Reviewed: 08/22/2022  [employer] Patient Education © 2023 [employer]

## 2025-03-27 NOTE — ED PROVIDER NOTE - CLINICAL SUMMARY MEDICAL DECISION MAKING FREE TEXT BOX
Presentation most consistent with Renal Colic from a Non-infected Kidney Stone.  Given History and Exam I have lower suspicion for atypical appendicitis, genital torsion, acute cholecystitis, AAA, Aortic Dissection, Serious Bacterial Illness or other emergent intraabdominal pathology.    Workup: CBC, CMP, CT Abd/Pelvis noncontrast, UA, reassess

## 2025-03-27 NOTE — ED ADULT NURSE NOTE - CAS DISCH CONDITION
Avoid pressure to wound site.  HIGH PROTIEN DIET  STOP SMOKING.  You can get  wound wet in shower. Change dressing right after.  Discontinue Hydrogel  Cleanse wound with normal saline or wound cleanser.  WOUND DRESSING; Apply  Foam to wound then cover with   BORDERED GAUZE .    Change dressing  3 x per week  and if necessary.  FOR COMPRESSION: Compression stocking to left leg.   Your are discharge from [facility].  
Stable

## 2025-04-29 ENCOUNTER — NON-APPOINTMENT (OUTPATIENT)
Age: 50
End: 2025-04-29

## 2025-04-30 ENCOUNTER — APPOINTMENT (OUTPATIENT)
Dept: UROLOGY | Facility: CLINIC | Age: 50
End: 2025-04-30

## 2025-04-30 DIAGNOSIS — R31.0 GROSS HEMATURIA: ICD-10-CM

## 2025-04-30 DIAGNOSIS — N20.0 CALCULUS OF KIDNEY: ICD-10-CM

## 2025-04-30 DIAGNOSIS — Z12.5 ENCOUNTER FOR SCREENING FOR MALIGNANT NEOPLASM OF PROSTATE: ICD-10-CM

## 2025-04-30 DIAGNOSIS — E83.50 UNSPECIFIED DISORDER OF CALCIUM METABOLISM: ICD-10-CM

## 2025-04-30 PROCEDURE — 99214 OFFICE O/P EST MOD 30 MIN: CPT

## 2025-04-30 RX ORDER — TRAMADOL HYDROCHLORIDE 50 MG/1
50 TABLET, COATED ORAL 3 TIMES DAILY
Qty: 15 | Refills: 0 | Status: ACTIVE | COMMUNITY
Start: 2025-04-30 | End: 1900-01-01

## 2025-04-30 RX ORDER — ONDANSETRON 8 MG/1
8 TABLET ORAL 3 TIMES DAILY
Qty: 15 | Refills: 0 | Status: ACTIVE | COMMUNITY
Start: 2025-04-30 | End: 1900-01-01

## 2025-05-12 ENCOUNTER — APPOINTMENT (OUTPATIENT)
Dept: NEUROLOGY | Facility: CLINIC | Age: 50
End: 2025-05-12
Payer: COMMERCIAL

## 2025-05-12 VITALS
DIASTOLIC BLOOD PRESSURE: 85 MMHG | BODY MASS INDEX: 42.66 KG/M2 | SYSTOLIC BLOOD PRESSURE: 125 MMHG | WEIGHT: 315 LBS | HEART RATE: 98 BPM | HEIGHT: 72 IN

## 2025-05-12 DIAGNOSIS — G35 MULTIPLE SCLEROSIS: ICD-10-CM

## 2025-05-12 PROCEDURE — G2211 COMPLEX E/M VISIT ADD ON: CPT

## 2025-05-12 PROCEDURE — 99214 OFFICE O/P EST MOD 30 MIN: CPT

## 2025-05-15 PROBLEM — N20.1 RIGHT URETERAL STONE: Status: ACTIVE | Noted: 2025-05-15

## 2025-05-20 ENCOUNTER — OUTPATIENT (OUTPATIENT)
Dept: OUTPATIENT SERVICES | Facility: HOSPITAL | Age: 50
LOS: 1 days | End: 2025-05-20
Payer: COMMERCIAL

## 2025-05-20 ENCOUNTER — APPOINTMENT (OUTPATIENT)
Dept: RADIOLOGY | Facility: CLINIC | Age: 50
End: 2025-05-20
Payer: COMMERCIAL

## 2025-05-20 ENCOUNTER — APPOINTMENT (OUTPATIENT)
Dept: ULTRASOUND IMAGING | Facility: CLINIC | Age: 50
End: 2025-05-20
Payer: COMMERCIAL

## 2025-05-20 DIAGNOSIS — Z96.0 PRESENCE OF UROGENITAL IMPLANTS: Chronic | ICD-10-CM

## 2025-05-20 DIAGNOSIS — N20.0 CALCULUS OF KIDNEY: ICD-10-CM

## 2025-05-20 DIAGNOSIS — Z98.890 OTHER SPECIFIED POSTPROCEDURAL STATES: Chronic | ICD-10-CM

## 2025-05-20 PROCEDURE — 74018 RADEX ABDOMEN 1 VIEW: CPT | Mod: 26

## 2025-05-20 PROCEDURE — 76770 US EXAM ABDO BACK WALL COMP: CPT

## 2025-05-20 PROCEDURE — 76770 US EXAM ABDO BACK WALL COMP: CPT | Mod: 26

## 2025-05-20 PROCEDURE — 74018 RADEX ABDOMEN 1 VIEW: CPT

## 2025-05-21 ENCOUNTER — APPOINTMENT (OUTPATIENT)
Dept: UROLOGY | Facility: CLINIC | Age: 50
End: 2025-05-21

## 2025-05-21 DIAGNOSIS — Z12.5 ENCOUNTER FOR SCREENING FOR MALIGNANT NEOPLASM OF PROSTATE: ICD-10-CM

## 2025-05-21 DIAGNOSIS — E83.50 UNSPECIFIED DISORDER OF CALCIUM METABOLISM: ICD-10-CM

## 2025-05-21 DIAGNOSIS — N20.1 CALCULUS OF URETER: ICD-10-CM

## 2025-05-21 DIAGNOSIS — R82.994 HYPERCALCIURIA: ICD-10-CM

## 2025-05-21 DIAGNOSIS — N20.0 CALCULUS OF KIDNEY: ICD-10-CM

## 2025-05-21 PROCEDURE — 99214 OFFICE O/P EST MOD 30 MIN: CPT

## 2025-08-18 ENCOUNTER — APPOINTMENT (OUTPATIENT)
Dept: MRI IMAGING | Facility: CLINIC | Age: 50
End: 2025-08-18

## 2025-08-18 ENCOUNTER — OUTPATIENT (OUTPATIENT)
Dept: OUTPATIENT SERVICES | Facility: HOSPITAL | Age: 50
LOS: 1 days | End: 2025-08-18
Payer: COMMERCIAL

## 2025-08-18 DIAGNOSIS — Z96.0 PRESENCE OF UROGENITAL IMPLANTS: Chronic | ICD-10-CM

## 2025-08-18 DIAGNOSIS — G35 MULTIPLE SCLEROSIS: ICD-10-CM

## 2025-08-18 DIAGNOSIS — Z00.8 ENCOUNTER FOR OTHER GENERAL EXAMINATION: ICD-10-CM

## 2025-08-18 DIAGNOSIS — Z98.890 OTHER SPECIFIED POSTPROCEDURAL STATES: Chronic | ICD-10-CM

## 2025-08-18 PROCEDURE — 76377 3D RENDER W/INTRP POSTPROCES: CPT | Mod: 26

## 2025-08-18 PROCEDURE — 72146 MRI CHEST SPINE W/O DYE: CPT | Mod: 26

## 2025-08-18 PROCEDURE — 76377 3D RENDER W/INTRP POSTPROCES: CPT

## 2025-08-18 PROCEDURE — 70551 MRI BRAIN STEM W/O DYE: CPT | Mod: 26

## 2025-08-18 PROCEDURE — 72141 MRI NECK SPINE W/O DYE: CPT | Mod: 26

## 2025-08-18 PROCEDURE — 72146 MRI CHEST SPINE W/O DYE: CPT

## 2025-08-18 PROCEDURE — 72141 MRI NECK SPINE W/O DYE: CPT

## 2025-08-18 PROCEDURE — 70551 MRI BRAIN STEM W/O DYE: CPT
